# Patient Record
Sex: FEMALE | Race: WHITE | NOT HISPANIC OR LATINO | Employment: OTHER | URBAN - METROPOLITAN AREA
[De-identification: names, ages, dates, MRNs, and addresses within clinical notes are randomized per-mention and may not be internally consistent; named-entity substitution may affect disease eponyms.]

---

## 2017-01-02 ENCOUNTER — HOSPITAL ENCOUNTER (OUTPATIENT)
Dept: URGENT CARE | Facility: CLINIC | Age: 82
Setting detail: SPECIMEN
Discharge: HOME | End: 2017-01-02
Attending: FAMILY MEDICINE | Admitting: FAMILY MEDICINE

## 2017-01-02 LAB
BACTERIA SPEC AEROBE CULT: NORMAL
Lab: NORMAL
MICRO REPORT STATUS: NORMAL
SPECIMEN SOURCE: NORMAL

## 2019-07-19 ENCOUNTER — TELEPHONE (OUTPATIENT)
Dept: CARDIOLOGY | Facility: CLINIC | Age: 84
End: 2019-07-19

## 2019-07-19 NOTE — TELEPHONE ENCOUNTER
Called patient, advised we had xarelto samples ready to be picked up. Gave patient 56 tablets with 0 refills.

## 2019-08-15 ENCOUNTER — OFFICE VISIT (OUTPATIENT)
Dept: CARDIOLOGY | Facility: CLINIC | Age: 84
End: 2019-08-15

## 2019-08-15 VITALS
OXYGEN SATURATION: 97 % | HEIGHT: 64 IN | DIASTOLIC BLOOD PRESSURE: 80 MMHG | HEART RATE: 81 BPM | SYSTOLIC BLOOD PRESSURE: 146 MMHG | WEIGHT: 163.75 LBS | BODY MASS INDEX: 27.96 KG/M2

## 2019-08-15 DIAGNOSIS — Z51.81 ANTICOAGULATION MANAGEMENT ENCOUNTER: Primary | ICD-10-CM

## 2019-08-15 DIAGNOSIS — Z79.01 ANTICOAGULATION MANAGEMENT ENCOUNTER: Primary | ICD-10-CM

## 2019-08-15 DIAGNOSIS — I10 ESSENTIAL HYPERTENSION: ICD-10-CM

## 2019-08-15 DIAGNOSIS — I48.20 CHRONIC ATRIAL FIBRILLATION (HCC): ICD-10-CM

## 2019-08-15 DIAGNOSIS — E78.5 DYSLIPIDEMIA: ICD-10-CM

## 2019-08-15 PROCEDURE — 93000 ELECTROCARDIOGRAM COMPLETE: CPT | Performed by: INTERNAL MEDICINE

## 2019-08-15 PROCEDURE — 99214 OFFICE O/P EST MOD 30 MIN: CPT | Performed by: INTERNAL MEDICINE

## 2019-08-15 RX ORDER — ERGOCALCIFEROL 1.25 MG/1
CAPSULE ORAL
Refills: 1 | COMMUNITY
Start: 2019-07-22

## 2019-08-15 NOTE — PROGRESS NOTES
Encounter Date:08/15/2019  Last seen 11/29/2018      Patient ID: Nir Mendez is a 83 y.o. female.    Chief Complaint:  Chronic atrial fibrillation  Anticoagulation management  Hypertension  Dyslipidemia    History of Present Illness  Since I have last seen, the patient has been without any chest discomfort ,shortness of breath, palpitations, dizziness or syncope.  Denies having any headache ,abdominal pain ,nausea, vomiting , diarrhea constipation, loss of weight or loss of appetite.  Denies having any excessive bruising ,hematuria or blood in the stool.    Review of all systems negative except as indicated    Assessment and Plan       /////////////////////    Impression  ----------------------  -Chronic atrial fibrillation. Patient has converted to sinus rhythm but did not stay in sinus rhythm.      -anticoagulation -on Xarelto    -Hypertension and dyslipidemia    -history of normal coronary arteries and normal left ventricular function 10/31/2014.  -----------  Plan  -----------  EKG showed atrial fibrillation with controlled ventricular response  Patient is not having any angina pectoris or congestive heart failure.  Continue metoprolol once a day and Cardizem CD   Excessive bruising-  improved with discontinuation of baby aspirin  ContinueXarelto  Anticoagulation status was reviewed.   medications were reviewed and updated.  Followup in the office in 6 months.  /////////////           Diagnosis Plan   1. Anticoagulation management encounter  ECG 12 Lead   2. Dyslipidemia  ECG 12 Lead   3. Essential hypertension  ECG 12 Lead   4. Chronic atrial fibrillation (CMS/HCC)  ECG 12 Lead   LAB RESULTS (LAST 7 DAYS)    CBC        BMP        CMP         BNP        TROPONIN        CoAg        Creatinine Clearance  CrCl cannot be calculated (Patient's most recent lab result is older than the maximum 30 days allowed.).    ABG        Radiology  No radiology results for the last day                The following portions of the  patient's history were reviewed and updated as appropriate: allergies, current medications, past family history, past medical history, past social history, past surgical history and problem list.    Review of Systems   Constitution: Positive for malaise/fatigue.   Cardiovascular: Positive for chest pain (heaviness ). Negative for leg swelling, palpitations and syncope.   Respiratory: Negative for shortness of breath.    Skin: Positive for rash (both legs, itching ).   Gastrointestinal: Negative for nausea and vomiting.   Neurological: Negative for dizziness, light-headedness and numbness.         Current Outpatient Medications:   •  atorvastatin (LIPITOR) 10 MG tablet, Take 10 mg by mouth Every Night., Disp: , Rfl: 2  •  diltiaZEM CD (CARDIZEM CD) 240 MG 24 hr capsule, Take 240 mg by mouth Every Morning., Disp: , Rfl: 1  •  metoprolol succinate XL (TOPROL-XL) 25 MG 24 hr tablet, Take 25 mg by mouth Every Morning., Disp: , Rfl: 3  •  rivaroxaban (XARELTO) 20 MG tablet, Take 1.5 tablets by mouth Every Night. 1/2 tablet , Disp: , Rfl:   •  rivaroxaban (XARELTO) 20 MG tablet, Take 1 tablet by mouth Daily., Disp: 56 tablet, Rfl: 0  •  triamterene-hydrochlorothiazide (MAXZIDE-25) 37.5-25 MG per tablet, Take 1 tablet by mouth Every Morning., Disp: , Rfl: 2  •  vitamin D (ERGOCALCIFEROL) 76652 units capsule capsule, TK 1 C PO Q WK, Disp: , Rfl: 1    No Known Allergies    Family History   Problem Relation Age of Onset   • Hypertension Other    • Stroke Other    • Mental illness Other        Past Surgical History:   Procedure Laterality Date   • CARDIAC CATHETERIZATION  10/2014       Past Medical History:   Diagnosis Date   • Atrial fibrillation (CMS/HCC)    • Hyperlipidemia    • Hypertension    • No known drug allergy        Family History   Problem Relation Age of Onset   • Hypertension Other    • Stroke Other    • Mental illness Other        Social History     Socioeconomic History   • Marital status:      Spouse  "name: Not on file   • Number of children: Not on file   • Years of education: Not on file   • Highest education level: Not on file   Tobacco Use   • Smoking status: Never Smoker   Substance and Sexual Activity   • Alcohol use: No     Frequency: Never           ECG 12 Lead  Date/Time: 8/15/2019 10:42 AM  Performed by: Isa Montejo MD  Authorized by: Isa Montejo MD   Comparison: compared with previous ECG   Comments: Atrial fibrillation with controlled ventricular response 80/min nonspecific ST-T wave changes normal axis normal intervals no ectopy.  No change from 11/29/2018              Objective:       Physical Exam    /80 (BP Location: Left arm, Patient Position: Sitting, Cuff Size: Adult)   Pulse 81   Ht 162.6 cm (64\")   Wt 74.3 kg (163 lb 12 oz)   SpO2 97%   BMI 28.11 kg/m²   The patient is alert, oriented and in no distress.    Vital signs as noted above.    Head and neck revealed no carotid bruits or jugular venous distension.  No thyromegaly or lymphadenopathy is present.    Lungs clear.  No wheezing.  Breath sounds are normal bilaterally.    Heart normal first and second heart sounds.  No murmur..  No pericardial rub is present.  No gallop is present.    Abdomen soft and nontender.  No organomegaly is present.    Extremities revealed good peripheral pulses without any pedal edema.  Bilateral lower extremities skin bumps probably cysts    Skin warm and dry.    Musculoskeletal system is grossly normal.    CNS grossly normal.        "

## 2019-09-23 RX ORDER — ATORVASTATIN CALCIUM 10 MG/1
TABLET, FILM COATED ORAL
Qty: 90 TABLET | Refills: 3 | Status: SHIPPED | OUTPATIENT
Start: 2019-09-23 | End: 2020-08-31

## 2019-10-30 ENCOUNTER — OFFICE VISIT (OUTPATIENT)
Dept: SURGERY | Facility: CLINIC | Age: 84
End: 2019-10-30

## 2019-10-30 ENCOUNTER — PREP FOR SURGERY (OUTPATIENT)
Dept: OTHER | Facility: HOSPITAL | Age: 84
End: 2019-10-30

## 2019-10-30 VITALS
DIASTOLIC BLOOD PRESSURE: 80 MMHG | SYSTOLIC BLOOD PRESSURE: 132 MMHG | OXYGEN SATURATION: 99 % | HEIGHT: 64 IN | BODY MASS INDEX: 27.31 KG/M2 | WEIGHT: 160 LBS | HEART RATE: 81 BPM

## 2019-10-30 DIAGNOSIS — C44.729 SQUAMOUS CELL CARCINOMA OF SKIN OF LEFT LOWER EXTREMITY, INCLUDING HIP: Primary | ICD-10-CM

## 2019-10-30 PROCEDURE — 99204 OFFICE O/P NEW MOD 45 MIN: CPT | Performed by: SURGERY

## 2019-10-30 RX ORDER — IMIQUIMOD 12.5 MG/.25G
CREAM TOPICAL 3 TIMES WEEKLY
COMMUNITY
End: 2021-04-07

## 2019-10-30 NOTE — H&P
Subjective   Nir Mendez is a 83 y.o. female.   No chief complaint on file.    There were no vitals taken for this visit.    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  The lesion on the left ankle is responding well to Aldara cream.  The lesion on her left medial thigh after biopsy and Aldara cream became very large tender and ulcerated in a very short period of time.  To go to her son's wedding in [place] and over the short duration of this trip it got much bigger and more ulcerated.  She denies any previous history of malignant neoplasm of the skin but is being followed closely by her dermatologist, Dr Camara.      The following portions of the patient's history were reviewed and updated as appropriate: allergies, current medications, past family history, past medical history, past social history, past surgical history and problem list.    Review of Systems   Constitutional: Negative for activity change and diaphoresis.   HENT: Negative.  Negative for sore throat and voice change.    Eyes: Negative for blurred vision.   Respiratory: Negative for wheezing.    Cardiovascular: Negative for chest pain.   Endocrine: Negative.  Negative for polyuria.   Genitourinary: Negative for urinary incontinence.   Musculoskeletal: Negative for arthralgias.   Skin: Positive for skin lesions. Negative for color change.   Neurological: Negative for dizziness, speech difficulty and confusion.   Hematological: Does not bruise/bleed easily.   Psychiatric/Behavioral: Negative for agitation.       Objective   Physical Exam   Constitutional: She is oriented to person, place, and time. She appears well-developed and well-nourished.   HENT:   Head: Normocephalic and atraumatic.   Eyes: EOM are normal.   Neck: Normal range of motion.   Cardiovascular: Normal rate.   Pulmonary/Chest:  Effort normal.   Abdominal: Soft.   Musculoskeletal: Normal range of motion.   Neurological: She is alert and oriented to person, place, and time.   Skin: Skin is warm and dry.   Ulcerated 2.5 cm lesion left upper medial shin.  Rounding neovascularization.  No cellulitis.  Very tender.  Multiple 1 cm mildly ulcerated skin changes over bilateral lower extremities.   Psychiatric: She has a normal mood and affect.         Assessment/Plan   There are no diagnoses linked to this encounter.  Please schedule wide local excision squamous cell carcinoma of the left lower extremity.  She will need clearance from Dr. Avalos to be off her Xarelto for her atrial fibrillation.  Continue to monitor the other lesions in conjunction with Dr. Camara in the event she fails medical management of these potential neoplasms as well.  Also discussed with her that she may require autologous split-thickness skin graft or delayed primary closure as primary closure at the time of surgery is not possible based on the size of her cancer.  Maria Ines Alejandre MD  10/30/2019  1:59 PM

## 2019-10-30 NOTE — PROGRESS NOTES
"Subjective   Nir Mendez is a 83 y.o. female.   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  The lesion on the left ankle is responding well to Aldara cream.  The lesion on her left medial thigh after biopsy and Aldara cream became very large tender and ulcerated in a very short period of time.  To go to her son's wedding in [place] and over the short duration of this trip it got much bigger and more ulcerated.  She denies any previous history of malignant neoplasm of the skin but is being followed closely by her dermatologist, Dr Camara.      The following portions of the patient's history were reviewed and updated as appropriate: allergies, current medications, past family history, past medical history, past social history, past surgical history and problem list.    Review of Systems   Constitutional: Negative for activity change and diaphoresis.   HENT: Negative.  Negative for sore throat and voice change.    Eyes: Negative for blurred vision.   Respiratory: Negative for wheezing.    Cardiovascular: Negative for chest pain.   Endocrine: Negative.  Negative for polyuria.   Genitourinary: Negative for urinary incontinence.   Musculoskeletal: Negative for arthralgias.   Skin: Positive for skin lesions. Negative for color change.   Neurological: Negative for dizziness, speech difficulty and confusion.   Hematological: Does not bruise/bleed easily.   Psychiatric/Behavioral: Negative for agitation.       Objective   Physical Exam   Constitutional: She is oriented to person, place, and time. She appears " well-developed and well-nourished.   HENT:   Head: Normocephalic and atraumatic.   Eyes: EOM are normal.   Neck: Normal range of motion.   Cardiovascular: Normal rate.   Pulmonary/Chest: Effort normal.   Abdominal: Soft.   Musculoskeletal: Normal range of motion.   Neurological: She is alert and oriented to person, place, and time.   Skin: Skin is warm and dry.   Ulcerated 2.5 cm lesion left upper medial shin.  Rounding neovascularization.  No cellulitis.  Very tender.  Multiple 1 cm mildly ulcerated skin changes over bilateral lower extremities.   Psychiatric: She has a normal mood and affect.         Assessment/Plan   Nir was seen today for skin cancer.    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  She will need clearance from Dr. Avalos to be off her Xarelto for her atrial fibrillation.  Continue to monitor the other lesions in conjunction with Dr. Camara in the event she fails medical management of these potential neoplasms as well.  Also discussed with her that she may require autologous split-thickness skin graft or delayed primary closure as primary closure at the time of surgery is not possible based on the size of her cancer.  Maria Ines Alejandre MD  10/30/2019  1:59 PM

## 2019-10-30 NOTE — PROGRESS NOTES
"Jaymie Mendez is a 83 y.o. female.   Chief Complaint   Patient presents with   • Skin Cancer     left ankle, left shin     /80 (BP Location: Left arm, Patient Position: Sitting, Cuff Size: Adult)   Pulse 81   Ht 162.6 cm (64\")   Wt 72.6 kg (160 lb)   SpO2 99%   BMI 27.46 kg/m²     HISTORY OF PRESENT ILLNESS:  ***      The following portions of the patient's history were reviewed and updated as appropriate: allergies, current medications, past family history, past medical history, past social history, past surgical history and problem list.    Review of Systems    Objective   Physical Exam      Assessment/Plan   There are no diagnoses linked to this encounter.    Note scribed for Dr. Pili Hagan PA-C  10/30/2019  1:32 PM  "

## 2019-11-12 ENCOUNTER — APPOINTMENT (OUTPATIENT)
Dept: PREADMISSION TESTING | Facility: HOSPITAL | Age: 84
End: 2019-11-12

## 2019-11-14 ENCOUNTER — APPOINTMENT (OUTPATIENT)
Dept: PREADMISSION TESTING | Facility: HOSPITAL | Age: 84
End: 2019-11-14

## 2019-11-18 RX ORDER — TRIAMTERENE AND HYDROCHLOROTHIAZIDE 37.5; 25 MG/1; MG/1
1 TABLET ORAL EVERY MORNING
Qty: 90 TABLET | Refills: 1 | Status: SHIPPED | OUTPATIENT
Start: 2019-11-18 | End: 2020-10-28

## 2019-11-21 RX ORDER — DILTIAZEM HYDROCHLORIDE 240 MG/1
CAPSULE, EXTENDED RELEASE ORAL
Qty: 90 CAPSULE | Refills: 0 | Status: SHIPPED | OUTPATIENT
Start: 2019-11-21 | End: 2020-06-01

## 2019-11-21 RX ORDER — METOPROLOL SUCCINATE 25 MG/1
TABLET, EXTENDED RELEASE ORAL
Qty: 90 TABLET | Refills: 0 | Status: SHIPPED | OUTPATIENT
Start: 2019-11-21 | End: 2020-03-02

## 2020-03-02 RX ORDER — METOPROLOL SUCCINATE 25 MG/1
TABLET, EXTENDED RELEASE ORAL
Qty: 90 TABLET | Refills: 0 | Status: SHIPPED | OUTPATIENT
Start: 2020-03-02 | End: 2020-04-13

## 2020-03-25 ENCOUNTER — OFFICE VISIT (OUTPATIENT)
Dept: CARDIOLOGY | Facility: CLINIC | Age: 85
End: 2020-03-25

## 2020-03-25 VITALS
BODY MASS INDEX: 26.98 KG/M2 | HEART RATE: 71 BPM | SYSTOLIC BLOOD PRESSURE: 121 MMHG | OXYGEN SATURATION: 98 % | DIASTOLIC BLOOD PRESSURE: 78 MMHG | WEIGHT: 158 LBS | HEIGHT: 64 IN

## 2020-03-25 DIAGNOSIS — I48.20 CHRONIC ATRIAL FIBRILLATION (HCC): Primary | ICD-10-CM

## 2020-03-25 DIAGNOSIS — I10 ESSENTIAL HYPERTENSION: ICD-10-CM

## 2020-03-25 DIAGNOSIS — E78.5 DYSLIPIDEMIA: ICD-10-CM

## 2020-03-25 DIAGNOSIS — Z79.01 CHRONIC ANTICOAGULATION: ICD-10-CM

## 2020-03-25 PROCEDURE — 93000 ELECTROCARDIOGRAM COMPLETE: CPT | Performed by: INTERNAL MEDICINE

## 2020-03-25 PROCEDURE — 99214 OFFICE O/P EST MOD 30 MIN: CPT | Performed by: INTERNAL MEDICINE

## 2020-03-25 NOTE — PROGRESS NOTES
Encounter Date:03/25/2020  6 months follow-up      Patient ID: Nir Mendez is a 84 y.o. female.    Chief Complaint:  Atrial fibrillation  Anticoagulation management  Hypertension  Dyslipidemia      History of Present Illness  Since I have last seen, the patient has been without any chest discomfort ,shortness of breath, palpitations, dizziness or syncope.  Denies having any headache ,abdominal pain ,nausea, vomiting , diarrhea constipation, loss of weight or loss of appetite.  Denies having any excessive bruising ,hematuria or blood in the stool.    Review of all systems negative except as indicated    Assessment and Plan       /////////////////////    Impression  ----------------------  -Chronic atrial fibrillation. Patient has converted to sinus rhythm but did not stay in sinus rhythm.       -anticoagulation -on Xarelto     -Hypertension and dyslipidemia     -history of normal coronary arteries and normal left ventricular function 10/31/2014.  -----------  Plan  -----------  EKG showed atrial fibrillation with controlled ventricular response  Patient is not having any angina pectoris or congestive heart failure.  Continue metoprolol once a day and Cardizem CD   ContinueXarelto  Anticoagulation status was reviewed.  medications were reviewed and updated.  Followup in the office in 6 months.  /////////////            Diagnosis Plan   1. Chronic atrial fibrillation     2. Chronic anticoagulation     3. Essential hypertension     4. Dyslipidemia     LAB RESULTS (LAST 7 DAYS)    CBC        BMP        CMP         BNP        TROPONIN        CoAg        Creatinine Clearance  CrCl cannot be calculated (Patient's most recent lab result is older than the maximum 30 days allowed.).    ABG        Radiology  No radiology results for the last day                The following portions of the patient's history were reviewed and updated as appropriate: allergies, current medications, past family history, past medical history, past  social history, past surgical history and problem list.    Review of Systems   Constitution: Negative for fever and malaise/fatigue.   HENT: Negative for congestion and hearing loss.    Eyes: Negative for double vision and visual disturbance.   Cardiovascular: Negative for chest pain, claudication, dyspnea on exertion, leg swelling, palpitations and syncope.   Respiratory: Negative for cough and shortness of breath.    Endocrine: Negative for cold intolerance.   Skin: Negative for color change and rash.   Musculoskeletal: Negative for arthritis and joint pain.   Gastrointestinal: Negative for abdominal pain and heartburn.   Genitourinary: Negative for hematuria.   Neurological: Negative for excessive daytime sleepiness and dizziness.   Psychiatric/Behavioral: Negative for depression. The patient is not nervous/anxious.    All other systems reviewed and are negative.        Current Outpatient Medications:   •  atorvastatin (LIPITOR) 10 MG tablet, TAKE 1 TABLET BY MOUTH EVERY NIGHT AT BEDTIME, Disp: 90 tablet, Rfl: 3  •  CARTIA  MG 24 hr capsule, TAKE 1 CAPSULE BY MOUTH EVERY MORNING, Disp: 90 capsule, Rfl: 0  •  imiquimod (ALDARA) 5 % cream, Apply  topically to the appropriate area as directed 3 (Three) Times a Week., Disp: , Rfl:   •  metoprolol succinate XL (TOPROL-XL) 25 MG 24 hr tablet, TAKE 1 TABLET BY MOUTH EVERY MORNING, Disp: 90 tablet, Rfl: 0  •  mupirocin (BACTROBAN) 2 % ointment, QUINTON TO LEFT SHIN BID, Disp: , Rfl: 3  •  rivaroxaban (XARELTO) 20 MG tablet, Take 1 tablet by mouth Daily., Disp: 56 tablet, Rfl: 0  •  triamterene-hydrochlorothiazide (MAXZIDE-25) 37.5-25 MG per tablet, TAKE 1 TABLET BY MOUTH EVERY MORNING, Disp: 90 tablet, Rfl: 1  •  vitamin D (ERGOCALCIFEROL) 00088 units capsule capsule, TK 1 C PO Q WK, Disp: , Rfl: 1    No Known Allergies    Family History   Problem Relation Age of Onset   • Hypertension Other    • Stroke Other    • Mental illness Other        Past Surgical History:  "  Procedure Laterality Date   • CARDIAC CATHETERIZATION  10/2014       Past Medical History:   Diagnosis Date   • Atrial fibrillation (CMS/HCC)    • Hyperlipidemia    • Hypertension    • No known drug allergy        Family History   Problem Relation Age of Onset   • Hypertension Other    • Stroke Other    • Mental illness Other        Social History     Socioeconomic History   • Marital status:      Spouse name: Not on file   • Number of children: Not on file   • Years of education: Not on file   • Highest education level: Not on file   Tobacco Use   • Smoking status: Never Smoker   • Smokeless tobacco: Never Used   Substance and Sexual Activity   • Alcohol use: No     Frequency: Never   • Drug use: Never   • Sexual activity: Defer           ECG 12 Lead  Date/Time: 3/25/2020 2:52 PM  Performed by: Isa Montejo MD  Authorized by: Isa Montejo MD   Comparison: compared with previous ECG   Similar to previous ECG  Comments: Atrial fibrillation with controlled ventricular response 62/min nonspecific ST-T wave changes normal axis normal intervals no ectopy no change from 8/15/2019              Objective:       Physical Exam    /78   Pulse 71   Ht 162.6 cm (64\")   Wt 71.7 kg (158 lb)   SpO2 98%   BMI 27.12 kg/m²   The patient is alert, oriented and in no distress.    Vital signs as noted above.    Head and neck revealed no carotid bruits or jugular venous distension.  No thyromegaly or lymphadenopathy is present.    Lungs clear.  No wheezing.  Breath sounds are normal bilaterally.    Heart normal first and second heart sounds.  No murmur..  No pericardial rub is present.  No gallop is present.    Abdomen soft and nontender.  No organomegaly is present.    Extremities revealed good peripheral pulses without any pedal edema.    Skin warm and dry.    Musculoskeletal system is grossly normal.    CNS grossly normal.        "

## 2020-04-13 RX ORDER — METOPROLOL SUCCINATE 25 MG/1
TABLET, EXTENDED RELEASE ORAL
Qty: 90 TABLET | Refills: 3 | Status: SHIPPED | OUTPATIENT
Start: 2020-04-13

## 2020-05-28 ENCOUNTER — TELEPHONE (OUTPATIENT)
Dept: CARDIOLOGY | Facility: CLINIC | Age: 85
End: 2020-05-28

## 2020-05-28 NOTE — TELEPHONE ENCOUNTER
Pt left VM asking for Xarelto Samples.   Samples ready for  - called pt to inform.VM not set up - no answer

## 2020-06-01 RX ORDER — DILTIAZEM HYDROCHLORIDE 240 MG/1
CAPSULE, COATED, EXTENDED RELEASE ORAL
Qty: 90 CAPSULE | Refills: 0 | Status: SHIPPED | OUTPATIENT
Start: 2020-06-01 | End: 2020-08-31

## 2020-07-02 ENCOUNTER — TELEPHONE (OUTPATIENT)
Dept: CARDIOLOGY | Facility: CLINIC | Age: 85
End: 2020-07-02

## 2020-08-31 RX ORDER — ATORVASTATIN CALCIUM 10 MG/1
TABLET, FILM COATED ORAL
Qty: 90 TABLET | Refills: 0 | Status: SHIPPED | OUTPATIENT
Start: 2020-08-31 | End: 2020-12-24 | Stop reason: SDUPTHER

## 2020-08-31 RX ORDER — DILTIAZEM HYDROCHLORIDE 240 MG/1
CAPSULE, COATED, EXTENDED RELEASE ORAL
Qty: 90 CAPSULE | Refills: 0 | Status: SHIPPED | OUTPATIENT
Start: 2020-08-31 | End: 2020-10-12

## 2020-09-28 ENCOUNTER — OFFICE VISIT (OUTPATIENT)
Dept: CARDIOLOGY | Facility: CLINIC | Age: 85
End: 2020-09-28

## 2020-09-28 VITALS
DIASTOLIC BLOOD PRESSURE: 85 MMHG | OXYGEN SATURATION: 95 % | BODY MASS INDEX: 27.4 KG/M2 | WEIGHT: 160.5 LBS | HEART RATE: 76 BPM | SYSTOLIC BLOOD PRESSURE: 165 MMHG | HEIGHT: 64 IN

## 2020-09-28 DIAGNOSIS — I10 ESSENTIAL HYPERTENSION: ICD-10-CM

## 2020-09-28 DIAGNOSIS — E78.5 DYSLIPIDEMIA: ICD-10-CM

## 2020-09-28 DIAGNOSIS — Z79.01 CHRONIC ANTICOAGULATION: ICD-10-CM

## 2020-09-28 DIAGNOSIS — I48.20 CHRONIC ATRIAL FIBRILLATION (HCC): Primary | ICD-10-CM

## 2020-09-28 PROCEDURE — 99214 OFFICE O/P EST MOD 30 MIN: CPT | Performed by: INTERNAL MEDICINE

## 2020-09-28 PROCEDURE — 93000 ELECTROCARDIOGRAM COMPLETE: CPT | Performed by: INTERNAL MEDICINE

## 2020-09-28 NOTE — PROGRESS NOTES
Encounter Date:09/28/2020  Last seen 3/25/2020      Patient ID: Nir Mendez is a 84 y.o. female.    Chief Complaint:  Atrial fibrillation  Anticoagulation management  Hypertension  Dyslipidemia        History of Present Illness  Since I have last seen, the patient has been without any chest discomfort ,shortness of breath, palpitations, dizziness or syncope.  Denies having any headache ,abdominal pain ,nausea, vomiting , diarrhea constipation, loss of weight or loss of appetite.  Denies having any,hematuria or blood in the stool.  Mild excessive bruising.  Review of all systems negative except as indicated.    Reviewed ROS  Assessment and Plan         /////////////////////    Impression  ----------------------  -Chronic atrial fibrillation. Patient has converted to sinus rhythm but did not stay in sinus rhythm.       -anticoagulation -on Xarelto     -Hypertension and dyslipidemia     -history of normal coronary arteries and normal left ventricular function 10/31/2014.  -----------  Plan  -----------  EKG showed atrial fibrillation with controlled ventricular response  Patient is not having any angina pectoris or congestive heart failure.  Continue metoprolol once a day and Cardizem CD   ContinueXarelto  Anticoagulation status was reviewed.  Patient is having mild excessive bruising.  Patient was asked to take half a tablet of Xarelto for couple days if she has excessive bruising  medications were reviewed and updated.  Followup in the office in 6 months.  Further plan will depend on patient's progress.  /////////////              Diagnosis Plan   1. Chronic atrial fibrillation (CMS/[facility])     2. Chronic anticoagulation     3. Essential hypertension     4. Dyslipidemia     LAB RESULTS (LAST 7 DAYS)    CBC        BMP        CMP         BNP        TROPONIN        CoAg        Creatinine Clearance  CrCl cannot be calculated (Patient's most recent lab result is older than the maximum 30 days allowed.).    ABG         Radiology  No radiology results for the last day                The following portions of the patient's history were reviewed and updated as appropriate: allergies, current medications, past family history, past medical history, past social history, past surgical history and problem list.    Review of Systems   Constitution: Negative for malaise/fatigue.   Cardiovascular: Negative for chest pain, leg swelling, palpitations and syncope.   Respiratory: Negative for shortness of breath.    Hematologic/Lymphatic: Bruises/bleeds easily.   Skin: Negative for rash.   Gastrointestinal: Negative for nausea and vomiting.   Neurological: Negative for dizziness, light-headedness and numbness.         Current Outpatient Medications:   •  atorvastatin (LIPITOR) 10 MG tablet, TAKE 1 TABLET BY MOUTH EVERY NIGHT AT BEDTIME, Disp: 90 tablet, Rfl: 0  •  dilTIAZem CD (CARDIZEM CD) 240 MG 24 hr capsule, TAKE 1 CAPSULE BY MOUTH EVERY MORNING, Disp: 90 capsule, Rfl: 0  •  imiquimod (ALDARA) 5 % cream, Apply  topically to the appropriate area as directed 3 (Three) Times a Week., Disp: , Rfl:   •  metoprolol succinate XL (TOPROL-XL) 25 MG 24 hr tablet, TAKE 1 TABLET BY MOUTH EVERY MORNING, Disp: 90 tablet, Rfl: 3  •  mupirocin (BACTROBAN) 2 % ointment, QUINTON TO LEFT SHIN BID, Disp: , Rfl: 3  •  rivaroxaban (Xarelto) 10 MG tablet, Take 1 tablet by mouth Daily., Disp: 28 tablet, Rfl: 0  •  triamterene-hydrochlorothiazide (MAXZIDE-25) 37.5-25 MG per tablet, TAKE 1 TABLET BY MOUTH EVERY MORNING, Disp: 90 tablet, Rfl: 1  •  vitamin D (ERGOCALCIFEROL) 80932 units capsule capsule, TK 1 C PO Q WK, Disp: , Rfl: 1    No Known Allergies    Family History   Problem Relation Age of Onset   • Hypertension Other    • Stroke Other    • Mental illness Other        Past Surgical History:   Procedure Laterality Date   • CARDIAC CATHETERIZATION  10/2014       Past Medical History:   Diagnosis Date   • Atrial fibrillation (CMS/HCC)    • Hyperlipidemia    •  "Hypertension    • No known drug allergy        Family History   Problem Relation Age of Onset   • Hypertension Other    • Stroke Other    • Mental illness Other        Social History     Socioeconomic History   • Marital status:      Spouse name: Not on file   • Number of children: Not on file   • Years of education: Not on file   • Highest education level: Not on file   Tobacco Use   • Smoking status: Never Smoker   • Smokeless tobacco: Never Used   Substance and Sexual Activity   • Alcohol use: No     Frequency: Never   • Drug use: Never   • Sexual activity: Defer           ECG 12 Lead    Date/Time: 9/28/2020 1:55 PM  Performed by: Isa Montejo MD  Authorized by: Isa Montejo MD   Comparison: compared with previous ECG   Similar to previous ECG  Comparison to previous ECG: Atrial fibrillation with controlled ventricular response 74/min nonspecific ST-T wave changes normal axis normal intervals no ectopy no change from 3/25/2020                Objective:       Physical Exam    /85   Pulse 76   Ht 162.6 cm (64\")   Wt 72.8 kg (160 lb 8 oz)   SpO2 95%   BMI 27.55 kg/m²   The patient is alert, oriented and in no distress.    Vital signs as noted above.    Head and neck revealed no carotid bruits or jugular venous distension.  No thyromegaly or lymphadenopathy is present.    Lungs clear.  No wheezing.  Breath sounds are normal bilaterally.    Heart normal first and second heart sounds.  No murmur..  No pericardial rub is present.  No gallop is present.    Abdomen soft and nontender.  No organomegaly is present.    Extremities revealed good peripheral pulses without any pedal edema.    Skin warm and dry.  Mild bruising.    Musculoskeletal system is grossly normal.    CNS grossly normal.        "

## 2020-10-12 RX ORDER — DILTIAZEM HYDROCHLORIDE 240 MG/1
CAPSULE, COATED, EXTENDED RELEASE ORAL
Qty: 90 CAPSULE | Refills: 3 | Status: SHIPPED | OUTPATIENT
Start: 2020-10-12 | End: 2021-09-29

## 2020-10-28 RX ORDER — TRIAMTERENE AND HYDROCHLOROTHIAZIDE 37.5; 25 MG/1; MG/1
1 TABLET ORAL EVERY MORNING
Qty: 90 TABLET | Refills: 1 | Status: SHIPPED | OUTPATIENT
Start: 2020-10-28

## 2020-12-24 RX ORDER — ATORVASTATIN CALCIUM 10 MG/1
10 TABLET, FILM COATED ORAL
Qty: 90 TABLET | Refills: 3 | Status: SHIPPED | OUTPATIENT
Start: 2020-12-24 | End: 2022-02-13

## 2021-01-20 NOTE — TELEPHONE ENCOUNTER
Pt needing samples, xarelto 10mg samples are upfront waiting to be picked up. Please sign off on rx.

## 2021-04-07 ENCOUNTER — OFFICE VISIT (OUTPATIENT)
Dept: CARDIOLOGY | Facility: CLINIC | Age: 86
End: 2021-04-07

## 2021-04-07 VITALS
DIASTOLIC BLOOD PRESSURE: 88 MMHG | HEIGHT: 64 IN | OXYGEN SATURATION: 95 % | HEART RATE: 76 BPM | SYSTOLIC BLOOD PRESSURE: 150 MMHG | TEMPERATURE: 98.2 F | BODY MASS INDEX: 27.31 KG/M2 | WEIGHT: 160 LBS

## 2021-04-07 DIAGNOSIS — E78.5 DYSLIPIDEMIA: ICD-10-CM

## 2021-04-07 DIAGNOSIS — I10 ESSENTIAL HYPERTENSION: ICD-10-CM

## 2021-04-07 DIAGNOSIS — I48.20 CHRONIC ATRIAL FIBRILLATION (HCC): Primary | ICD-10-CM

## 2021-04-07 DIAGNOSIS — Z79.01 CHRONIC ANTICOAGULATION: ICD-10-CM

## 2021-04-07 PROCEDURE — 93000 ELECTROCARDIOGRAM COMPLETE: CPT | Performed by: INTERNAL MEDICINE

## 2021-04-07 PROCEDURE — 99214 OFFICE O/P EST MOD 30 MIN: CPT | Performed by: INTERNAL MEDICINE

## 2021-04-07 NOTE — PROGRESS NOTES
Encounter Date:04/07/2021  Last seen 9/28/2020      Patient ID: Nir Mendez is a 85 y.o. female.    Chief Complaint:  Atrial fibrillation  Anticoagulation management  Hypertension  Dyslipidemia        History of Present Illness  Since I have last seen, the patient has been without any chest discomfort ,shortness of breath, palpitations, dizziness or syncope.  Denies having any headache ,abdominal pain ,nausea, vomiting , diarrhea constipation, loss of weight or loss of appetite.  Denies having any excessive bruising ,hematuria or blood in the stool.    Review of all systems negative except as indicated.    Reviewed ROS.    Assessment and Plan         /////////////////////    Impression  ----------------------  -Chronic atrial fibrillation. Patient has converted to sinus rhythm but did not stay in sinus rhythm.       -anticoagulation -on Xarelto     -Hypertension and dyslipidemia     -history of normal coronary arteries and normal left ventricular function 10/31/2014.  -----------  Plan  -----------  Chronic atrial fibrillation  EKG showed atrial fibrillation with controlled ventricular response  Patient is not having any angina pectoris or congestive heart failure.  Continue metoprolol once a day and Cardizem CD    Anticoagulation status reviewed  ContinueXarelto    Hypertension-150/88.  Continue Cardizem CD metoprolol triamterene hydrochlorothiazide    Dyslipidemia-continue atorvastatin.    Medications were reviewed and updated.  Followup in the office in 6 months.  Further plan will depend on patient's progress.  /////////////                            Diagnosis Plan   1. Chronic atrial fibrillation (CMS/[facility])     2. Essential hypertension     3. Dyslipidemia     4. Chronic anticoagulation     LAB RESULTS (LAST 7 DAYS)    CBC        BMP        CMP         BNP        TROPONIN        CoAg        Creatinine Clearance  CrCl cannot be calculated (Patient's most recent lab result is older than the maximum 30 days  allowed.).    ABG        Radiology  No radiology results for the last day                The following portions of the patient's history were reviewed and updated as appropriate: allergies, current medications, past family history, past medical history, past social history, past surgical history and problem list.    Review of Systems   Constitutional: Negative for malaise/fatigue.   Cardiovascular: Negative for chest pain, leg swelling and palpitations.   Respiratory: Negative for shortness of breath.    Skin: Negative for rash.   Neurological: Negative for dizziness, light-headedness and numbness.         Current Outpatient Medications:   •  atorvastatin (LIPITOR) 10 MG tablet, Take 1 tablet by mouth every night at bedtime., Disp: 90 tablet, Rfl: 3  •  dilTIAZem CD (CARDIZEM CD) 240 MG 24 hr capsule, TAKE 1 CAPSULE BY MOUTH EVERY MORNING, Disp: 90 capsule, Rfl: 3  •  metoprolol succinate XL (TOPROL-XL) 25 MG 24 hr tablet, TAKE 1 TABLET BY MOUTH EVERY MORNING, Disp: 90 tablet, Rfl: 3  •  rivaroxaban (Xarelto) 10 MG tablet, Take 1 tablet by mouth Daily., Disp: 28 tablet, Rfl: 0  •  triamterene-hydrochlorothiazide (MAXZIDE-25) 37.5-25 MG per tablet, TAKE 1 TABLET BY MOUTH EVERY MORNING, Disp: 90 tablet, Rfl: 1  •  vitamin D (ERGOCALCIFEROL) 01846 units capsule capsule, TK 1 C PO Q WK, Disp: , Rfl: 1    No Known Allergies    Family History   Problem Relation Age of Onset   • Hypertension Other    • Stroke Other    • Mental illness Other        Past Surgical History:   Procedure Laterality Date   • CARDIAC CATHETERIZATION  10/2014       Past Medical History:   Diagnosis Date   • Atrial fibrillation (CMS/HCC)    • Hyperlipidemia    • Hypertension    • No known drug allergy        Family History   Problem Relation Age of Onset   • Hypertension Other    • Stroke Other    • Mental illness Other        Social History     Socioeconomic History   • Marital status:      Spouse name: Not on file   • Number of children:  "Not on file   • Years of education: Not on file   • Highest education level: Not on file   Tobacco Use   • Smoking status: Never Smoker   • Smokeless tobacco: Never Used   Substance and Sexual Activity   • Alcohol use: No   • Drug use: Never   • Sexual activity: Defer           ECG 12 Lead    Date/Time: 4/7/2021 2:21 PM  Performed by: Isa Montejo MD  Authorized by: Isa Montejo MD   Comparison: compared with previous ECG   Similar to previous ECG  Comparison to previous ECG: Atrial fibrillation with controlled ventricular response 58/min nonspecific ST-T wave changes normal axis normal intervals no ectopy no significant change from 9/28/2020                Objective:       Physical Exam    /88   Pulse 76   Temp 98.2 °F (36.8 °C)   Ht 162.6 cm (64\")   Wt 72.6 kg (160 lb)   SpO2 95%   BMI 27.46 kg/m²   The patient is alert, oriented and in no distress.    Vital signs as noted above.    Head and neck revealed no carotid bruits or jugular venous distension.  No thyromegaly or lymphadenopathy is present.    Lungs clear.  No wheezing.  Breath sounds are normal bilaterally.    Heart normal first and second heart sounds.  No murmur..  No pericardial rub is present.  No gallop is present.    Abdomen soft and nontender.  No organomegaly is present.    Extremities revealed good peripheral pulses without any pedal edema.    Skin warm and dry.    Musculoskeletal system is grossly normal.    CNS grossly normal.    Reviewed and unchanged from last visit.        "

## 2021-08-06 ENCOUNTER — TELEPHONE (OUTPATIENT)
Dept: CARDIOLOGY | Facility: CLINIC | Age: 86
End: 2021-08-06

## 2021-08-06 NOTE — TELEPHONE ENCOUNTER
Patient called and is needing samples for Xarelto 10mg po qd. 5 bottles were placed upfront to be picked up as well as a co pay card for patient to get assistance.

## 2021-09-13 ENCOUNTER — TELEPHONE (OUTPATIENT)
Dept: CARDIOLOGY | Facility: CLINIC | Age: 86
End: 2021-09-13

## 2021-09-13 NOTE — TELEPHONE ENCOUNTER
Called patient - advised patient samples are ready along with assistance card. Need to try to get that assistance approved to be able to continue with samples if in donut hole.   Understood.

## 2021-09-29 RX ORDER — DILTIAZEM HYDROCHLORIDE 240 MG/1
CAPSULE, COATED, EXTENDED RELEASE ORAL
Qty: 90 CAPSULE | Refills: 2 | Status: SHIPPED | OUTPATIENT
Start: 2021-09-29 | End: 2021-10-21

## 2021-10-12 ENCOUNTER — TELEPHONE (OUTPATIENT)
Dept: CARDIOLOGY | Facility: CLINIC | Age: 86
End: 2021-10-12

## 2021-10-19 ENCOUNTER — OFFICE VISIT (OUTPATIENT)
Dept: CARDIOLOGY | Facility: CLINIC | Age: 86
End: 2021-10-19

## 2021-10-19 VITALS
WEIGHT: 153 LBS | OXYGEN SATURATION: 100 % | HEART RATE: 67 BPM | HEIGHT: 64 IN | DIASTOLIC BLOOD PRESSURE: 72 MMHG | BODY MASS INDEX: 26.12 KG/M2 | SYSTOLIC BLOOD PRESSURE: 128 MMHG

## 2021-10-19 DIAGNOSIS — E78.5 DYSLIPIDEMIA: ICD-10-CM

## 2021-10-19 DIAGNOSIS — I10 ESSENTIAL HYPERTENSION: ICD-10-CM

## 2021-10-19 DIAGNOSIS — I48.20 CHRONIC ATRIAL FIBRILLATION (HCC): Primary | ICD-10-CM

## 2021-10-19 DIAGNOSIS — Z79.01 CHRONIC ANTICOAGULATION: ICD-10-CM

## 2021-10-19 PROCEDURE — 93000 ELECTROCARDIOGRAM COMPLETE: CPT | Performed by: INTERNAL MEDICINE

## 2021-10-19 PROCEDURE — 99214 OFFICE O/P EST MOD 30 MIN: CPT | Performed by: INTERNAL MEDICINE

## 2021-10-19 NOTE — PROGRESS NOTES
Encounter Date:10/19/2021  Last seen 4/7/2021      Patient ID: Nir Mendez is a 85 y.o. female.    Chief Complaint:  Atrial fibrillation  Anticoagulation management  Hypertension  Dyslipidemia        History of Present Illness  Since I have last seen, the patient has been without any chest discomfort ,shortness of breath, palpitations, dizziness or syncope.  Denies having any headache ,abdominal pain ,nausea, vomiting , diarrhea constipation, loss of weight or loss of appetite.  Denies having any excessive bruising ,hematuria or blood in the stool.    Review of all systems negative except as indicated.    Reviewed ROS.    Assessment and Plan         /////////////////////    Impression  ----------------------  -Chronic atrial fibrillation. Patient has converted to sinus rhythm but did not stay in sinus rhythm.       -anticoagulation -on Xarelto     -Hypertension and dyslipidemia     -history of normal coronary arteries and normal left ventricular function 10/31/2014.  -----------  Plan  -----------  Chronic atrial fibrillation-rate is well controlled  EKG showed atrial fibrillation with controlled ventricular response-10/19/2021  Patient is not having any angina pectoris or congestive heart failure.  Continue metoprolol once a day and Cardizem CD     Anticoagulation status reviewed  Continue Xarelto 10 mg a day     Hypertension-well-controlled  128/72  Continue Cardizem CD metoprolol triamterene hydrochlorothiazide     Dyslipidemia-continue atorvastatin.     Medications were reviewed and updated.  Followup in the office in 6 months.  Further plan will depend on patient's progress.  /////////////                              Diagnosis Plan   1. Chronic atrial fibrillation (HCC)  ECG 12 Lead   2. Essential hypertension  ECG 12 Lead   3. Dyslipidemia  ECG 12 Lead   4. Chronic anticoagulation  ECG 12 Lead   LAB RESULTS (LAST 7 DAYS)    CBC        BMP        CMP         BNP        TROPONIN        CoAg        Creatinine  Clearance  CrCl cannot be calculated (Patient's most recent lab result is older than the maximum 30 days allowed.).    ABG        Radiology  No radiology results for the last day                The following portions of the patient's history were reviewed and updated as appropriate: allergies, current medications, past family history, past medical history, past social history, past surgical history and problem list.    Review of Systems   Constitutional: Positive for malaise/fatigue. Negative for fever.   HENT: Negative for ear pain and nosebleeds.    Eyes: Negative for blurred vision and double vision.   Cardiovascular: Negative for chest pain, dyspnea on exertion and palpitations.   Respiratory: Negative for cough and shortness of breath.    Skin: Negative for rash.   Musculoskeletal: Negative for joint pain.   Gastrointestinal: Negative for abdominal pain, nausea and vomiting.   Neurological: Negative for focal weakness and headaches.   Psychiatric/Behavioral: Negative for depression. The patient is not nervous/anxious.    All other systems reviewed and are negative.        Current Outpatient Medications:   •  atorvastatin (LIPITOR) 10 MG tablet, Take 1 tablet by mouth every night at bedtime., Disp: 90 tablet, Rfl: 3  •  dilTIAZem CD (CARDIZEM CD) 240 MG 24 hr capsule, TAKE 1 CAPSULE BY MOUTH EVERY MORNING, Disp: 90 capsule, Rfl: 2  •  metoprolol succinate XL (TOPROL-XL) 25 MG 24 hr tablet, TAKE 1 TABLET BY MOUTH EVERY MORNING, Disp: 90 tablet, Rfl: 3  •  rivaroxaban (Xarelto) 10 MG tablet, Take 1 tablet by mouth Daily., Disp: 35 tablet, Rfl: 0  •  triamterene-hydrochlorothiazide (MAXZIDE-25) 37.5-25 MG per tablet, TAKE 1 TABLET BY MOUTH EVERY MORNING, Disp: 90 tablet, Rfl: 1  •  vitamin D (ERGOCALCIFEROL) 09770 units capsule capsule, TK 1 C PO Q WK, Disp: , Rfl: 1    No Known Allergies    Family History   Problem Relation Age of Onset   • Hypertension Other    • Stroke Other    • Mental illness Other        Past  "Surgical History:   Procedure Laterality Date   • CARDIAC CATHETERIZATION  10/2014       Past Medical History:   Diagnosis Date   • Atrial fibrillation (HCC)    • Hyperlipidemia    • Hypertension    • No known drug allergy        Family History   Problem Relation Age of Onset   • Hypertension Other    • Stroke Other    • Mental illness Other        Social History     Socioeconomic History   • Marital status:    Tobacco Use   • Smoking status: Never Smoker   • Smokeless tobacco: Never Used   Vaping Use   • Vaping Use: Never used   Substance and Sexual Activity   • Alcohol use: No   • Drug use: Never   • Sexual activity: Defer           ECG 12 Lead    Date/Time: 10/19/2021 2:13 PM  Performed by: Isa Montejo MD  Authorized by: Isa Montejo MD   Comparison: compared with previous ECG   Similar to previous ECG  Comparison to previous ECG: Atrial fibrillation with controlled ventricular response 57/min nonspecific ST-T wave changes normal axis normal intervals no ectopy no significant change from 4/7/2021                Objective:       Physical Exam    /72   Pulse 67   Ht 162.6 cm (64\")   Wt 69.4 kg (153 lb)   SpO2 100%   BMI 26.26 kg/m²   The patient is alert, oriented and in no distress.    Vital signs as noted above.    Head and neck revealed no carotid bruits or jugular venous distension.  No thyromegaly or lymphadenopathy is present.    Lungs clear.  No wheezing.  Breath sounds are normal bilaterally.    Heart normal first and second heart sounds.  No murmur..  No pericardial rub is present.  No gallop is present.    Abdomen soft and nontender.  No organomegaly is present.    Extremities revealed good peripheral pulses without any pedal edema.    Skin warm and dry.    Musculoskeletal system is grossly normal.    CNS grossly normal.    Reviewed and unchanged from last visit.        "

## 2021-10-21 RX ORDER — DILTIAZEM HYDROCHLORIDE 240 MG/1
CAPSULE, COATED, EXTENDED RELEASE ORAL
Qty: 90 CAPSULE | Refills: 3 | Status: SHIPPED | OUTPATIENT
Start: 2021-10-21 | End: 2023-03-02

## 2021-10-21 NOTE — TELEPHONE ENCOUNTER
Rx Refill Note  Requested Prescriptions     Pending Prescriptions Disp Refills   • dilTIAZem CD (CARDIZEM CD) 240 MG 24 hr capsule [Pharmacy Med Name: DILTIAZEM CD 240MG CAPSULES (24 HR)] 90 capsule 3     Sig: TAKE 1 CAPSULE BY MOUTH EVERY MORNING      Last office visit with prescribing clinician: 10/19/2021      Next office visit with prescribing clinician: 4/21/2022            Umu Porter MA  10/21/21, 14:12 EDT

## 2022-01-21 ENCOUNTER — TELEPHONE (OUTPATIENT)
Dept: CARDIOLOGY | Facility: CLINIC | Age: 87
End: 2022-01-21

## 2022-02-13 RX ORDER — ATORVASTATIN CALCIUM 10 MG/1
10 TABLET, FILM COATED ORAL
Qty: 90 TABLET | Refills: 3 | Status: SHIPPED | OUTPATIENT
Start: 2022-02-13 | End: 2022-12-19

## 2022-02-13 NOTE — TELEPHONE ENCOUNTER
Rx Refill Note  Requested Prescriptions     Pending Prescriptions Disp Refills   • atorvastatin (LIPITOR) 10 MG tablet [Pharmacy Med Name: ATORVASTATIN 10MG TABLETS] 90 tablet 3     Sig: TAKE 1 TABLET BY MOUTH EVERY NIGHT AT BEDTIME      Last office visit with prescribing clinician: 10/19/2021      Next office visit with prescribing clinician: 4/21/2022            Umu Porter MA  02/13/22, 12:33 EST

## 2022-02-18 ENCOUNTER — TELEPHONE (OUTPATIENT)
Dept: CARDIOLOGY | Facility: CLINIC | Age: 87
End: 2022-02-18

## 2022-02-18 NOTE — TELEPHONE ENCOUNTER
Rx Refill Note  Requested Prescriptions     Pending Prescriptions Disp Refills   • rivaroxaban (Xarelto) 10 MG tablet 35 tablet 0     Sig: Take 1 tablet by mouth Daily.      Last office visit with prescribing clinician: 10/19/2021      Next office visit with prescribing clinician: 4/21/2022            Lynda Paiz MA  02/18/22, 12:47 EST

## 2022-04-29 ENCOUNTER — TELEPHONE (OUTPATIENT)
Dept: CARDIOLOGY | Facility: CLINIC | Age: 87
End: 2022-04-29

## 2022-06-08 ENCOUNTER — TELEPHONE (OUTPATIENT)
Dept: CARDIOLOGY | Facility: CLINIC | Age: 87
End: 2022-06-08

## 2022-07-08 NOTE — TELEPHONE ENCOUNTER
Rx Refill Note  Requested Prescriptions     Pending Prescriptions Disp Refills   • rivaroxaban (Xarelto) 10 MG tablet 10 tablet 0     Sig: Take 1 tablet by mouth Daily.      Last office visit with prescribing clinician: 10/19/2021      Next office visit with prescribing clinician: 7/25/2022            Lynda Paiz MA  07/08/22, 14:23 EDT

## 2022-07-11 ENCOUNTER — TELEPHONE (OUTPATIENT)
Dept: CARDIOLOGY | Facility: CLINIC | Age: 87
End: 2022-07-11

## 2022-07-11 NOTE — TELEPHONE ENCOUNTER
WE GAVE PATIENT COUPON FOR XARELTO ON Friday.  PHARMACY COULD NOT DO ANYTHING BECAUSE THE PRESCRIPTION WAS CALLED IN FOR 10 TABLETS. CAN WE CHANGE THAT TO 30 TABLETS SO SHE CAN USE THE COUPON AND GET IT FOR FREE.

## 2022-07-15 ENCOUNTER — TELEPHONE (OUTPATIENT)
Dept: CARDIOLOGY | Facility: CLINIC | Age: 87
End: 2022-07-15

## 2022-07-15 NOTE — TELEPHONE ENCOUNTER
I called and spoke with out drug rep for Xarelto and she was not aware that the discount cards did not cover the 10mg dose. She is going to do some further research on this and get back with us.     However; she did want to clarify with  if the patient should be on the Xarelto 10mg for A-fib because the common dosage for a-fib is 20mg.

## 2022-07-15 NOTE — TELEPHONE ENCOUNTER
Called pharmacist RX for 10mg is not covered by coupon as stated on the packet. See original phone note. Pt has samples of Xarelto until issue can be addressed on Monday    no IV

## 2022-07-15 NOTE — TELEPHONE ENCOUNTER
PATIENT NEEDS A PRESCRIPTION FOR A STARTER PACK FOR XARELTO FOR 10MG TABS. THAT IS THE ONLY WAY TO GET THE 10MG. [address]. SHE IS OUT OF THEM.

## 2022-07-15 NOTE — TELEPHONE ENCOUNTER
Pt called and I spoke to Hector at [facility]   Pt has been on Xarelto 10mg daily for awhiile, she was given a coupon from our office recently.    The coupon can not be used for the 10mg    Pt is out of Xarelto she is on her way to  samples.     Will figure out coupon soon and get back to pt. Pt understood.

## 2022-08-10 ENCOUNTER — OFFICE VISIT (OUTPATIENT)
Dept: CARDIOLOGY | Facility: CLINIC | Age: 87
End: 2022-08-10

## 2022-08-10 VITALS
WEIGHT: 155.5 LBS | SYSTOLIC BLOOD PRESSURE: 151 MMHG | OXYGEN SATURATION: 99 % | BODY MASS INDEX: 26.55 KG/M2 | HEART RATE: 64 BPM | DIASTOLIC BLOOD PRESSURE: 80 MMHG | HEIGHT: 64 IN

## 2022-08-10 DIAGNOSIS — Z79.01 CHRONIC ANTICOAGULATION: ICD-10-CM

## 2022-08-10 DIAGNOSIS — E78.5 DYSLIPIDEMIA: ICD-10-CM

## 2022-08-10 DIAGNOSIS — I10 ESSENTIAL HYPERTENSION: ICD-10-CM

## 2022-08-10 DIAGNOSIS — I48.20 CHRONIC ATRIAL FIBRILLATION: Primary | ICD-10-CM

## 2022-08-10 PROCEDURE — 93000 ELECTROCARDIOGRAM COMPLETE: CPT | Performed by: INTERNAL MEDICINE

## 2022-08-10 PROCEDURE — 99214 OFFICE O/P EST MOD 30 MIN: CPT | Performed by: INTERNAL MEDICINE

## 2022-08-10 NOTE — PROGRESS NOTES
Encounter Date:08/10/2022  Last seen 10/19/2021      Patient ID: Nir Mendez is a 86 y.o. female.    Chief Complaint:    Atrial fibrillation  Anticoagulation management  Hypertension  Dyslipidemia        History of Present Illness  Since I have last seen, the patient has been without any chest discomfort ,shortness of breath, palpitations, dizziness or syncope.  Denies having any headache ,abdominal pain ,nausea, vomiting , diarrhea constipation, loss of weight or loss of appetite.  Denies having any excessive bruising ,hematuria or blood in the stool.    Review of all systems negative except as indicated.    Reviewed ROS.  Assessment and Plan         /////////////////////    Impression  ----------------------  -Chronic atrial fibrillation.  Patient has converted to sinus rhythm but did not stay in sinus rhythm.       -anticoagulation -on Xarelto     -Hypertension and dyslipidemia     -history of normal coronary arteries and normal left ventricular function 10/31/2014.  -----------  Plan  -----------  Chronic atrial fibrillation-rate is well controlled  EKG showed atrial fibrillation with controlled ventricular response-.  8/10/2022  Patient is not having any angina pectoris or congestive heart failure.  Continue metoprolol once a day and Cardizem CD     Anticoagulation status reviewed  Continue Xarelto 10 mg a day.  Samples of Xarelto if available.    Hypertension- 150/80  Continue Cardizem CD metoprolol triamterene hydrochlorothiazide     Dyslipidemia-continue atorvastatin.     Medications were reviewed and updated.  Followup in the office in 6 months.  Further plan will depend on patient's progress.  /////////////                    Diagnosis Plan   1. Chronic atrial fibrillation (HCC)  ECG 12 Lead   2. Essential hypertension  ECG 12 Lead   3. Dyslipidemia  ECG 12 Lead   4. Chronic anticoagulation  ECG 12 Lead   LAB RESULTS (LAST 7 DAYS)    CBC        BMP        CMP         BNP        TROPONIN        CoAg         Creatinine Clearance  CrCl cannot be calculated (Patient's most recent lab result is older than the maximum 30 days allowed.).    ABG        Radiology  No radiology results for the last day                The following portions of the patient's history were reviewed and updated as appropriate: allergies, current medications, past family history, past medical history, past social history, past surgical history and problem list.    Review of Systems   Constitutional: Positive for malaise/fatigue. Negative for fever.   Cardiovascular: Negative for chest pain, dyspnea on exertion and palpitations.   Respiratory: Negative for cough and shortness of breath.    Skin: Negative for rash.   Gastrointestinal: Negative for abdominal pain, nausea and vomiting.   Neurological: Negative for focal weakness and headaches.   All other systems reviewed and are negative.        Current Outpatient Medications:   •  atorvastatin (LIPITOR) 10 MG tablet, TAKE 1 TABLET BY MOUTH EVERY NIGHT AT BEDTIME, Disp: 90 tablet, Rfl: 3  •  dilTIAZem CD (CARDIZEM CD) 240 MG 24 hr capsule, TAKE 1 CAPSULE BY MOUTH EVERY MORNING, Disp: 90 capsule, Rfl: 3  •  metoprolol succinate XL (TOPROL-XL) 25 MG 24 hr tablet, TAKE 1 TABLET BY MOUTH EVERY MORNING, Disp: 90 tablet, Rfl: 3  •  rivaroxaban (Xarelto) 10 MG tablet, Take 1 tablet by mouth Daily., Disp: 30 tablet, Rfl: 0  •  triamterene-hydrochlorothiazide (MAXZIDE-25) 37.5-25 MG per tablet, TAKE 1 TABLET BY MOUTH EVERY MORNING, Disp: 90 tablet, Rfl: 1  •  vitamin D (ERGOCALCIFEROL) 01831 units capsule capsule, TK 1 C PO Q WK, Disp: , Rfl: 1    No Known Allergies    Family History   Problem Relation Age of Onset   • Hypertension Other    • Stroke Other    • Mental illness Other        Past Surgical History:   Procedure Laterality Date   • CARDIAC CATHETERIZATION  10/2014       Past Medical History:   Diagnosis Date   • Atrial fibrillation (HCC)    • Hyperlipidemia    • Hypertension    • No known drug  "allergy        Family History   Problem Relation Age of Onset   • Hypertension Other    • Stroke Other    • Mental illness Other        Social History     Socioeconomic History   • Marital status:    Tobacco Use   • Smoking status: Never Smoker   • Smokeless tobacco: Never Used   Vaping Use   • Vaping Use: Never used   Substance and Sexual Activity   • Alcohol use: No   • Drug use: Never   • Sexual activity: Defer           ECG 12 Lead    Date/Time: 8/10/2022 9:40 AM  Performed by: Isa Montejo MD  Authorized by: Isa Montejo MD   Comparison: compared with previous ECG   Similar to previous ECG  Comparison to previous ECG: Atrial fibrillation with controlled ventricular response nonspecific ST-T wave changes 60/min normal axis normal intervals no ectopy no significant change from 10/19/2021                Objective:       Physical Exam    /80 (BP Location: Right arm, Patient Position: Sitting, Cuff Size: Adult)   Pulse 64   Ht 162.6 cm (64\")   Wt 70.5 kg (155 lb 8 oz)   SpO2 99%   BMI 26.69 kg/m²   The patient is alert, oriented and in no distress.    Vital signs as noted above.    Head and neck revealed no carotid bruits or jugular venous distension.  No thyromegaly or lymphadenopathy is present.    Lungs clear.  No wheezing.  Breath sounds are normal bilaterally.    Heart normal first and second heart sounds.  No murmur..  No pericardial rub is present.  No gallop is present.    Abdomen soft and nontender.  No organomegaly is present.    Extremities revealed good peripheral pulses without any pedal edema.    Skin warm and dry.    Musculoskeletal system is grossly normal.    CNS grossly normal.    Reviewed and updated.        "

## 2022-09-23 ENCOUNTER — TELEPHONE (OUTPATIENT)
Dept: CARDIOLOGY | Facility: CLINIC | Age: 87
End: 2022-09-23

## 2022-09-23 NOTE — TELEPHONE ENCOUNTER
Contacted patient, advised samples are up front ready for .   Understood       Rx Refill Note  Requested Prescriptions     Pending Prescriptions Disp Refills   • rivaroxaban (Xarelto) 10 MG tablet 35 tablet 0     Sig: Take 1 tablet by mouth Daily.      Last office visit with prescribing clinician: 8/10/2022      Next office visit with prescribing clinician: 2/21/2023            Lynda Paiz MA  09/23/22, 13:06 EDT

## 2022-12-19 RX ORDER — ATORVASTATIN CALCIUM 10 MG/1
10 TABLET, FILM COATED ORAL
Qty: 90 TABLET | Refills: 3 | Status: SHIPPED | OUTPATIENT
Start: 2022-12-19

## 2022-12-19 NOTE — TELEPHONE ENCOUNTER
Rx Refill Note  Requested Prescriptions     Pending Prescriptions Disp Refills   • atorvastatin (LIPITOR) 10 MG tablet [Pharmacy Med Name: ATORVASTATIN 10MG TABLETS] 90 tablet 3     Sig: TAKE 1 TABLET BY MOUTH EVERY NIGHT AT BEDTIME      Last office visit with prescribing clinician: 8/10/2022   Last telemedicine visit with prescribing clinician: 2/21/2023   Next office visit with prescribing clinician: 2/21/2023                         Would you like a call back once the refill request has been completed: [] Yes [] No    If the office needs to give you a call back, can they leave a voicemail: [] Yes [] No    Steff Camilo MA  12/19/22, 08:04 EST

## 2023-02-12 NOTE — PROGRESS NOTES
Encounter Date:02/21/2023  Last seen 8/10/2022      Patient ID: Nir Mendez is a 87 y.o. female.    Chief Complaint:    Atrial fibrillation  Anticoagulation management  Hypertension  Dyslipidemia        History of Present Illness  Patient is having some tiredness.  Recently Dr. Barrientos changed her Xarelto to Coumadin because of cost.    Since I have last seen, the patient has been without any chest discomfort ,shortness of breath, palpitations, dizziness or syncope.  Denies having any headache ,abdominal pain ,nausea, vomiting , diarrhea constipation, loss of weight or loss of appetite.  Denies having any excessive bruising ,hematuria or blood in the stool.    Review of all systems negative except as indicated.    Reviewed ROS.    Assessment and Plan         /////////////////////    Impression  ----------------------  -Chronic atrial fibrillation.  Patient has converted to sinus rhythm but did not stay in sinus rhythm.       -anticoagulation -on Coumadin     -Hypertension and dyslipidemia     -history of normal coronary arteries and normal left ventricular function 10/31/2014.  -----------  Plan  -----------  Chronic atrial fibrillation-rate is well controlled  EKG showed atrial fibrillation with controlled ventricular response-.  2/21/2023  Patient is not having any angina pectoris or congestive heart failure.  Continue metoprolol once a day and Cardizem CD     Anticoagulation status reviewed  Recently patient has been switched from Xarelto to Coumadin by Dr. Barrientos.  Cost factor.  Patient would prefer to have PT/INR performed in the office here.  Previous PT/INR labs were reviewed.  Patient to have PT/INR performed in about 3 weeks in the office and adjust the dosage.     Hypertension-  well controlled.  130/80  Continue Cardizem CD metoprolol triamterene hydrochlorothiazide     Dyslipidemia-continue atorvastatin.     Medications were reviewed and updated.    Followup in the office in 6 months.  Further plan will  depend on patient's progress.  /////////////                      Diagnosis Plan   1. Chronic atrial fibrillation (HCC)        2. Essential hypertension        3. Dyslipidemia        4. Chronic anticoagulation        LAB RESULTS (LAST 7 DAYS)    CBC        BMP        CMP         BNP        TROPONIN        CoAg        Creatinine Clearance  CrCl cannot be calculated (Patient's most recent lab result is older than the maximum 30 days allowed.).    ABG        Radiology  No radiology results for the last day                The following portions of the patient's history were reviewed and updated as appropriate: allergies, current medications, past family history, past medical history, past social history, past surgical history and problem list.    Review of Systems   Constitutional: Positive for malaise/fatigue.   Cardiovascular: Negative for chest pain, leg swelling, palpitations and syncope.   Respiratory: Negative for shortness of breath.    Skin: Negative for rash.   Gastrointestinal: Negative for nausea and vomiting.   Neurological: Negative for dizziness, light-headedness and numbness.   All other systems reviewed and are negative.        Current Outpatient Medications:   •  atorvastatin (LIPITOR) 10 MG tablet, TAKE 1 TABLET BY MOUTH EVERY NIGHT AT BEDTIME, Disp: 90 tablet, Rfl: 3  •  dilTIAZem CD (CARDIZEM CD) 240 MG 24 hr capsule, TAKE 1 CAPSULE BY MOUTH EVERY MORNING, Disp: 90 capsule, Rfl: 3  •  metoprolol succinate XL (TOPROL-XL) 25 MG 24 hr tablet, TAKE 1 TABLET BY MOUTH EVERY MORNING, Disp: 90 tablet, Rfl: 3  •  triamterene-hydrochlorothiazide (MAXZIDE-25) 37.5-25 MG per tablet, TAKE 1 TABLET BY MOUTH EVERY MORNING, Disp: 90 tablet, Rfl: 1  •  vitamin D (ERGOCALCIFEROL) 34360 units capsule capsule, TK 1 C PO Q WK, Disp: , Rfl: 1  •  warfarin (COUMADIN) 3 MG tablet, Take 3 mg by mouth Daily. 1.5mg  M W F   1mg  S T R Sa, Disp: , Rfl:   •  rivaroxaban (Xarelto) 10 MG tablet, Take 1 tablet by mouth Daily.,  "Disp: 35 tablet, Rfl: 0    No Known Allergies    Family History   Problem Relation Age of Onset   • Hypertension Other    • Stroke Other    • Mental illness Other        Past Surgical History:   Procedure Laterality Date   • CARDIAC CATHETERIZATION  10/2014       Past Medical History:   Diagnosis Date   • Atrial fibrillation (HCC)    • Hyperlipidemia    • Hypertension    • No known drug allergy        Family History   Problem Relation Age of Onset   • Hypertension Other    • Stroke Other    • Mental illness Other        Social History     Socioeconomic History   • Marital status:    Tobacco Use   • Smoking status: Never   • Smokeless tobacco: Never   Vaping Use   • Vaping Use: Never used   Substance and Sexual Activity   • Alcohol use: No   • Drug use: Never   • Sexual activity: Defer           ECG 12 Lead    Date/Time: 2/21/2023 4:01 PM  Performed by: Isa Montejo MD  Authorized by: Isa Montejo MD   Comparison: compared with previous ECG   Similar to previous ECG  Comparison to previous ECG: Atrial fibrillation with controlled ventricular response 79/min nonspecific ST-T wave changes normal axis normal intervals no ectopy no significant change from previous EKG.                Objective:       Physical Exam    /81 (BP Location: Left arm, Patient Position: Sitting, Cuff Size: Large Adult)   Pulse 79   Ht 162.6 cm (64\")   Wt 69.9 kg (154 lb)   SpO2 98%   BMI 26.43 kg/m²   The patient is alert, oriented and in no distress.    Vital signs as noted above.    Head and neck revealed no carotid bruits or jugular venous distension.  No thyromegaly or lymphadenopathy is present.    Lungs clear.  No wheezing.  Breath sounds are normal bilaterally.    Heart normal first and second heart sounds.  No murmur..  No pericardial rub is present.  No gallop is present.    Abdomen soft and nontender.  No organomegaly is present.    Extremities revealed good peripheral pulses without any pedal " edema.    Skin warm and dry.    Musculoskeletal system is grossly normal.    CNS grossly normal.    Reviewed and updated.

## 2023-02-21 ENCOUNTER — OFFICE VISIT (OUTPATIENT)
Dept: CARDIOLOGY | Facility: CLINIC | Age: 88
End: 2023-02-21
Payer: MEDICARE

## 2023-02-21 VITALS
SYSTOLIC BLOOD PRESSURE: 130 MMHG | WEIGHT: 154 LBS | DIASTOLIC BLOOD PRESSURE: 81 MMHG | HEART RATE: 79 BPM | HEIGHT: 64 IN | BODY MASS INDEX: 26.29 KG/M2 | OXYGEN SATURATION: 98 %

## 2023-02-21 DIAGNOSIS — I48.20 CHRONIC ATRIAL FIBRILLATION: Primary | ICD-10-CM

## 2023-02-21 DIAGNOSIS — Z79.01 CHRONIC ANTICOAGULATION: ICD-10-CM

## 2023-02-21 DIAGNOSIS — I10 ESSENTIAL HYPERTENSION: ICD-10-CM

## 2023-02-21 DIAGNOSIS — E78.5 DYSLIPIDEMIA: ICD-10-CM

## 2023-02-21 PROCEDURE — 99214 OFFICE O/P EST MOD 30 MIN: CPT | Performed by: INTERNAL MEDICINE

## 2023-02-21 PROCEDURE — 93000 ELECTROCARDIOGRAM COMPLETE: CPT | Performed by: INTERNAL MEDICINE

## 2023-02-21 RX ORDER — WARFARIN SODIUM 3 MG/1
3 TABLET ORAL
COMMUNITY

## 2023-03-02 RX ORDER — DILTIAZEM HYDROCHLORIDE 240 MG/1
CAPSULE, COATED, EXTENDED RELEASE ORAL
Qty: 90 CAPSULE | Refills: 3 | Status: SHIPPED | OUTPATIENT
Start: 2023-03-02

## 2023-03-02 NOTE — TELEPHONE ENCOUNTER
Rx Refill Note  Requested Prescriptions     Pending Prescriptions Disp Refills   • dilTIAZem CD (CARDIZEM CD) 240 MG 24 hr capsule [Pharmacy Med Name: DILTIAZEM CD 240MG CAPSULES (24 HR)] 90 capsule 3     Sig: TAKE 1 CAPSULE BY MOUTH EVERY MORNING      Last office visit with prescribing clinician: 2/21/2023   Last telemedicine visit with prescribing clinician: 3/15/2023   Next office visit with prescribing clinician: 8/31/2023                           Umu Porter MA  03/02/23, 16:22 EST

## 2023-03-15 ENCOUNTER — ANTICOAGULATION VISIT (OUTPATIENT)
Dept: CARDIOLOGY | Facility: CLINIC | Age: 88
End: 2023-03-15
Payer: MEDICARE

## 2023-03-15 VITALS
SYSTOLIC BLOOD PRESSURE: 159 MMHG | WEIGHT: 154 LBS | BODY MASS INDEX: 26.43 KG/M2 | HEART RATE: 64 BPM | DIASTOLIC BLOOD PRESSURE: 59 MMHG

## 2023-03-15 DIAGNOSIS — Z79.01 CHRONIC ANTICOAGULATION: ICD-10-CM

## 2023-03-15 DIAGNOSIS — I48.20 CHRONIC ATRIAL FIBRILLATION: Primary | ICD-10-CM

## 2023-03-15 LAB — INR PPP: 1.5 (ref 0.9–1.1)

## 2023-03-15 PROCEDURE — 85610 PROTHROMBIN TIME: CPT | Performed by: INTERNAL MEDICINE

## 2023-03-15 PROCEDURE — 36416 COLLJ CAPILLARY BLOOD SPEC: CPT | Performed by: INTERNAL MEDICINE

## 2023-03-31 ENCOUNTER — ANTICOAGULATION VISIT (OUTPATIENT)
Dept: CARDIOLOGY | Facility: CLINIC | Age: 88
End: 2023-03-31
Payer: MEDICARE

## 2023-03-31 VITALS
WEIGHT: 156 LBS | SYSTOLIC BLOOD PRESSURE: 154 MMHG | DIASTOLIC BLOOD PRESSURE: 65 MMHG | HEART RATE: 62 BPM | BODY MASS INDEX: 26.78 KG/M2

## 2023-03-31 DIAGNOSIS — I48.20 CHRONIC ATRIAL FIBRILLATION: Primary | ICD-10-CM

## 2023-03-31 DIAGNOSIS — Z79.01 CHRONIC ANTICOAGULATION: ICD-10-CM

## 2023-03-31 LAB — INR PPP: 1.6 (ref 0.9–1.1)

## 2023-03-31 PROCEDURE — 36416 COLLJ CAPILLARY BLOOD SPEC: CPT | Performed by: INTERNAL MEDICINE

## 2023-03-31 PROCEDURE — 85610 PROTHROMBIN TIME: CPT | Performed by: INTERNAL MEDICINE

## 2023-04-14 ENCOUNTER — ANTICOAGULATION VISIT (OUTPATIENT)
Dept: CARDIOLOGY | Facility: CLINIC | Age: 88
End: 2023-04-14
Payer: MEDICARE

## 2023-04-14 VITALS
DIASTOLIC BLOOD PRESSURE: 63 MMHG | SYSTOLIC BLOOD PRESSURE: 140 MMHG | HEART RATE: 72 BPM | BODY MASS INDEX: 26.78 KG/M2 | WEIGHT: 156 LBS

## 2023-04-14 DIAGNOSIS — I48.20 CHRONIC ATRIAL FIBRILLATION: Primary | ICD-10-CM

## 2023-04-14 DIAGNOSIS — Z79.01 CHRONIC ANTICOAGULATION: ICD-10-CM

## 2023-04-14 LAB — INR PPP: 1.6 (ref 0.9–1.1)

## 2023-04-14 PROCEDURE — 85610 PROTHROMBIN TIME: CPT | Performed by: INTERNAL MEDICINE

## 2023-04-14 PROCEDURE — 36416 COLLJ CAPILLARY BLOOD SPEC: CPT | Performed by: INTERNAL MEDICINE

## 2023-05-01 ENCOUNTER — ANTICOAGULATION VISIT (OUTPATIENT)
Dept: CARDIOLOGY | Facility: CLINIC | Age: 88
End: 2023-05-01
Payer: MEDICARE

## 2023-05-01 VITALS
SYSTOLIC BLOOD PRESSURE: 142 MMHG | HEART RATE: 102 BPM | BODY MASS INDEX: 26.43 KG/M2 | WEIGHT: 154 LBS | OXYGEN SATURATION: 100 % | DIASTOLIC BLOOD PRESSURE: 81 MMHG

## 2023-05-01 DIAGNOSIS — I48.20 CHRONIC ATRIAL FIBRILLATION: Primary | ICD-10-CM

## 2023-05-01 DIAGNOSIS — Z79.01 CHRONIC ANTICOAGULATION: ICD-10-CM

## 2023-05-01 LAB — INR PPP: 2.2 (ref 2–3)

## 2023-05-01 PROCEDURE — 85610 PROTHROMBIN TIME: CPT | Performed by: INTERNAL MEDICINE

## 2023-05-01 PROCEDURE — 36416 COLLJ CAPILLARY BLOOD SPEC: CPT | Performed by: INTERNAL MEDICINE

## 2023-05-25 ENCOUNTER — ANTICOAGULATION VISIT (OUTPATIENT)
Dept: CARDIOLOGY | Facility: CLINIC | Age: 88
End: 2023-05-25
Payer: MEDICARE

## 2023-05-25 VITALS
SYSTOLIC BLOOD PRESSURE: 153 MMHG | BODY MASS INDEX: 26.95 KG/M2 | HEART RATE: 63 BPM | WEIGHT: 157 LBS | DIASTOLIC BLOOD PRESSURE: 63 MMHG

## 2023-05-25 DIAGNOSIS — Z79.01 CHRONIC ANTICOAGULATION: ICD-10-CM

## 2023-05-25 DIAGNOSIS — I48.20 CHRONIC ATRIAL FIBRILLATION: Primary | ICD-10-CM

## 2023-05-25 LAB — INR PPP: 2 (ref 0.9–1.1)

## 2023-05-25 PROCEDURE — 36416 COLLJ CAPILLARY BLOOD SPEC: CPT | Performed by: INTERNAL MEDICINE

## 2023-05-25 PROCEDURE — 85610 PROTHROMBIN TIME: CPT | Performed by: INTERNAL MEDICINE

## 2023-08-10 ENCOUNTER — ANTICOAGULATION VISIT (OUTPATIENT)
Dept: CARDIOLOGY | Facility: CLINIC | Age: 88
End: 2023-08-10
Payer: MEDICARE

## 2023-08-10 DIAGNOSIS — Z79.01 CHRONIC ANTICOAGULATION: ICD-10-CM

## 2023-08-10 DIAGNOSIS — I48.20 CHRONIC ATRIAL FIBRILLATION: Primary | ICD-10-CM

## 2023-08-10 LAB — INR PPP: 1.7 (ref 2–3)

## 2023-08-10 PROCEDURE — 85610 PROTHROMBIN TIME: CPT | Performed by: INTERNAL MEDICINE

## 2023-08-10 PROCEDURE — 36416 COLLJ CAPILLARY BLOOD SPEC: CPT | Performed by: INTERNAL MEDICINE

## 2023-08-16 NOTE — PROGRESS NOTES
Encounter Date:08/31/2023  Last seen 2/21/2023      Patient ID: Nir Mendez is a 87 y.o. female.    Chief Complaint:    Atrial fibrillation  Anticoagulation management  Hypertension  Dyslipidemia        History of Present Illness  Since I have last seen, the patient has been without any chest discomfort ,shortness of breath, palpitations, dizziness or syncope.  Denies having any headache ,abdominal pain ,nausea, vomiting , diarrhea constipation, loss of weight or loss of appetite.  Denies having any excessive bruising ,hematuria or blood in the stool.    Review of all systems negative except as indicated.    Reviewed ROS.    Assessment and Plan         /////////////////////  History  ----------------------  -Chronic atrial fibrillation.  Patient has converted to sinus rhythm but did not stay in sinus rhythm.       -anticoagulation -on Coumadin     -Hypertension and dyslipidemia     -history of normal coronary arteries and normal left ventricular function 10/31/2014.  -----------  Plan  -----------  Chronic atrial fibrillation-rate is well controlled  EKG showed atrial fibrillation with controlled ventricular response-.  8/31/2023  Patient is not having any angina pectoris or congestive heart failure.  Continue metoprolol once a day and Cardizem CD     Anticoagulation status reviewed  Patient is on Coumadin.  PT/INR on a monthly basis.    Hypertension-  well controlled.  123/73  Continue Cardizem CD metoprolol triamterene hydrochlorothiazide     Dyslipidemia-continue atorvastatin.     Medications were reviewed and updated.     Followup in the office in 6 months.    Further plan will depend on patient's progress.    Reviewed and updated-8/31/2023  /////////////              Diagnosis Plan   1. Chronic atrial fibrillation        2. Chronic anticoagulation        3. Essential hypertension        4. Dyslipidemia        LAB RESULTS (LAST 7 DAYS)    CBC        BMP        CMP         BNP        TROPONIN         CoAg  Results from last 7 days   Lab Units 08/10/23  0907   INR  1.70*       Creatinine Clearance  CrCl cannot be calculated (Patient's most recent lab result is older than the maximum 30 days allowed.).    ABG        Radiology  No radiology results for the last day                The following portions of the patient's history were reviewed and updated as appropriate: allergies, current medications, past family history, past medical history, past social history, past surgical history, and problem list.    Review of Systems   Constitutional: Positive for malaise/fatigue.   Cardiovascular:  Positive for leg swelling. Negative for chest pain, dyspnea on exertion and palpitations.   Respiratory:  Negative for cough and shortness of breath.    Gastrointestinal:  Negative for abdominal pain, nausea and vomiting.   Neurological:  Positive for weakness. Negative for dizziness, focal weakness, headaches, light-headedness and numbness.   All other systems reviewed and are negative.      Current Outpatient Medications:     dilTIAZem CD (CARDIZEM CD) 240 MG 24 hr capsule, TAKE 1 CAPSULE BY MOUTH EVERY MORNING, Disp: 90 capsule, Rfl: 3    atorvastatin (LIPITOR) 10 MG tablet, TAKE 1 TABLET BY MOUTH EVERY NIGHT AT BEDTIME, Disp: 90 tablet, Rfl: 3    metoprolol succinate XL (TOPROL-XL) 25 MG 24 hr tablet, TAKE 1 TABLET BY MOUTH EVERY MORNING, Disp: 90 tablet, Rfl: 3    rivaroxaban (Xarelto) 10 MG tablet, Take 1 tablet by mouth Daily., Disp: 35 tablet, Rfl: 0    triamterene-hydrochlorothiazide (MAXZIDE-25) 37.5-25 MG per tablet, TAKE 1 TABLET BY MOUTH EVERY MORNING, Disp: 90 tablet, Rfl: 1    vitamin D (ERGOCALCIFEROL) 05587 units capsule capsule, TK 1 C PO Q WK, Disp: , Rfl: 1    warfarin (COUMADIN) 3 MG tablet, Take 3 mg by mouth Daily. 1.5mg  M W F   1mg  S T R Sa, Disp: , Rfl:     No Known Allergies    Family History   Problem Relation Age of Onset    Hypertension Other     Stroke Other     Mental illness Other        Past  Surgical History:   Procedure Laterality Date    CARDIAC CATHETERIZATION  10/2014       Past Medical History:   Diagnosis Date    Atrial fibrillation     Hyperlipidemia     Hypertension     No known drug allergy        Family History   Problem Relation Age of Onset    Hypertension Other     Stroke Other     Mental illness Other        Social History     Socioeconomic History    Marital status:    Tobacco Use    Smoking status: Never    Smokeless tobacco: Never   Vaping Use    Vaping Use: Never used   Substance and Sexual Activity    Alcohol use: No    Drug use: Never    Sexual activity: Defer           ECG 12 Lead    Date/Time: 8/31/2023 10:46 AM  Performed by: Isa Montejo MD  Authorized by: Isa Montejo MD   Comparison: compared with previous ECG   Similar to previous ECG  Comparison to previous ECG: Atrial fibrillation with controlled ventricular response 57/min nonspecific ST-T wave changes normal axis normal intervals no ectopy no significant change from 2/21/2023        Objective:       Physical Exam    There were no vitals taken for this visit.  The patient is alert, oriented and in no distress.    Vital signs as noted above.    Head and neck revealed no carotid bruits or jugular venous distension.  No thyromegaly or lymphadenopathy is present.    Lungs clear.  No wheezing.  Breath sounds are normal bilaterally.    Heart normal first and second heart sounds.  No murmur..  No pericardial rub is present.  No gallop is present.    Abdomen soft and nontender.  No organomegaly is present.    Extremities revealed good peripheral pulses without any pedal edema.    Skin warm and dry.    Musculoskeletal system is grossly normal.    CNS grossly normal.    Reviewed and updated.

## 2023-08-31 ENCOUNTER — OFFICE VISIT (OUTPATIENT)
Dept: CARDIOLOGY | Facility: CLINIC | Age: 88
End: 2023-08-31
Payer: MEDICARE

## 2023-08-31 ENCOUNTER — ANTICOAGULATION VISIT (OUTPATIENT)
Dept: CARDIOLOGY | Facility: CLINIC | Age: 88
End: 2023-08-31
Payer: MEDICARE

## 2023-08-31 VITALS
WEIGHT: 156 LBS | BODY MASS INDEX: 26.63 KG/M2 | HEART RATE: 50 BPM | SYSTOLIC BLOOD PRESSURE: 123 MMHG | DIASTOLIC BLOOD PRESSURE: 73 MMHG | OXYGEN SATURATION: 96 % | HEIGHT: 64 IN

## 2023-08-31 VITALS
WEIGHT: 156 LBS | DIASTOLIC BLOOD PRESSURE: 73 MMHG | SYSTOLIC BLOOD PRESSURE: 123 MMHG | BODY MASS INDEX: 26.78 KG/M2 | HEART RATE: 50 BPM

## 2023-08-31 DIAGNOSIS — Z79.01 CHRONIC ANTICOAGULATION: ICD-10-CM

## 2023-08-31 DIAGNOSIS — I48.20 CHRONIC ATRIAL FIBRILLATION: Primary | ICD-10-CM

## 2023-08-31 DIAGNOSIS — I10 ESSENTIAL HYPERTENSION: ICD-10-CM

## 2023-08-31 DIAGNOSIS — E78.5 DYSLIPIDEMIA: ICD-10-CM

## 2023-08-31 LAB — INR PPP: 1.7 (ref 2–3)

## 2023-08-31 PROCEDURE — 85610 PROTHROMBIN TIME: CPT | Performed by: INTERNAL MEDICINE

## 2023-08-31 PROCEDURE — 36416 COLLJ CAPILLARY BLOOD SPEC: CPT | Performed by: INTERNAL MEDICINE

## 2023-09-20 ENCOUNTER — ANTICOAGULATION VISIT (OUTPATIENT)
Dept: CARDIOLOGY | Facility: CLINIC | Age: 88
End: 2023-09-20
Payer: MEDICARE

## 2023-09-20 VITALS
WEIGHT: 157 LBS | BODY MASS INDEX: 26.95 KG/M2 | SYSTOLIC BLOOD PRESSURE: 149 MMHG | DIASTOLIC BLOOD PRESSURE: 68 MMHG | HEART RATE: 67 BPM

## 2023-09-20 DIAGNOSIS — I48.20 CHRONIC ATRIAL FIBRILLATION: Primary | ICD-10-CM

## 2023-09-20 DIAGNOSIS — Z79.01 CHRONIC ANTICOAGULATION: ICD-10-CM

## 2023-09-20 LAB — INR PPP: 1.9 (ref 2–3)

## 2023-09-20 PROCEDURE — 85610 PROTHROMBIN TIME: CPT | Performed by: INTERNAL MEDICINE

## 2023-09-20 PROCEDURE — 36416 COLLJ CAPILLARY BLOOD SPEC: CPT | Performed by: INTERNAL MEDICINE

## 2023-10-19 ENCOUNTER — ANTICOAGULATION VISIT (OUTPATIENT)
Dept: CARDIOLOGY | Facility: CLINIC | Age: 88
End: 2023-10-19
Payer: MEDICARE

## 2023-10-19 VITALS — DIASTOLIC BLOOD PRESSURE: 64 MMHG | SYSTOLIC BLOOD PRESSURE: 132 MMHG | HEART RATE: 66 BPM

## 2023-10-19 DIAGNOSIS — Z79.01 CHRONIC ANTICOAGULATION: ICD-10-CM

## 2023-10-19 DIAGNOSIS — I48.20 CHRONIC ATRIAL FIBRILLATION: Primary | ICD-10-CM

## 2023-10-19 LAB — INR PPP: 1.8 (ref 2–3)

## 2023-10-19 PROCEDURE — 36416 COLLJ CAPILLARY BLOOD SPEC: CPT | Performed by: INTERNAL MEDICINE

## 2023-10-19 PROCEDURE — 85610 PROTHROMBIN TIME: CPT | Performed by: INTERNAL MEDICINE

## 2023-10-19 RX ORDER — AMOXICILLIN AND CLAVULANATE POTASSIUM 875; 125 MG/1; MG/1
1 TABLET, FILM COATED ORAL EVERY 12 HOURS SCHEDULED
COMMUNITY
Start: 2023-10-02

## 2023-11-15 ENCOUNTER — ANTICOAGULATION VISIT (OUTPATIENT)
Dept: CARDIOLOGY | Facility: CLINIC | Age: 88
End: 2023-11-15
Payer: MEDICARE

## 2023-11-15 VITALS — DIASTOLIC BLOOD PRESSURE: 60 MMHG | SYSTOLIC BLOOD PRESSURE: 160 MMHG | HEART RATE: 74 BPM

## 2023-11-15 DIAGNOSIS — Z79.01 CHRONIC ANTICOAGULATION: ICD-10-CM

## 2023-11-15 DIAGNOSIS — I48.20 CHRONIC ATRIAL FIBRILLATION: Primary | ICD-10-CM

## 2023-11-15 LAB — INR PPP: 2 (ref 2–3)

## 2023-11-15 PROCEDURE — 36416 COLLJ CAPILLARY BLOOD SPEC: CPT | Performed by: INTERNAL MEDICINE

## 2023-11-15 PROCEDURE — 85610 PROTHROMBIN TIME: CPT | Performed by: INTERNAL MEDICINE

## 2023-12-14 RX ORDER — DILTIAZEM HYDROCHLORIDE 240 MG/1
CAPSULE, COATED, EXTENDED RELEASE ORAL
Qty: 90 CAPSULE | Refills: 3 | Status: SHIPPED | OUTPATIENT
Start: 2023-12-14

## 2023-12-14 RX ORDER — ATORVASTATIN CALCIUM 10 MG/1
10 TABLET, FILM COATED ORAL
Qty: 90 TABLET | Refills: 3 | Status: SHIPPED | OUTPATIENT
Start: 2023-12-14

## 2023-12-14 NOTE — TELEPHONE ENCOUNTER
Rx Refill Note  Requested Prescriptions     Pending Prescriptions Disp Refills    atorvastatin (LIPITOR) 10 MG tablet [Pharmacy Med Name: ATORVASTATIN 10MG TABLETS] 90 tablet 3     Sig: TAKE 1 TABLET BY MOUTH EVERY NIGHT AT BEDTIME    dilTIAZem CD (CARDIZEM CD) 240 MG 24 hr capsule [Pharmacy Med Name: DILTIAZEM CD 240MG CAPSULES (24 HR)] 90 capsule 3     Sig: TAKE 1 CAPSULE BY MOUTH EVERY MORNING      Last office visit with prescribing clinician: 8/31/2023   Last telemedicine visit with prescribing clinician: Visit date not found   Next office visit with prescribing clinician: 3/7/2024                         Would you like a call back once the refill request has been completed: [] Yes [] No    If the office needs to give you a call back, can they leave a voicemail: [] Yes [] No    Steff Camilo MA  12/14/23, 08:53 EST

## 2023-12-19 ENCOUNTER — ANTICOAGULATION VISIT (OUTPATIENT)
Dept: CARDIOLOGY | Facility: CLINIC | Age: 88
End: 2023-12-19
Payer: MEDICARE

## 2023-12-19 VITALS — SYSTOLIC BLOOD PRESSURE: 150 MMHG | HEART RATE: 75 BPM | DIASTOLIC BLOOD PRESSURE: 89 MMHG

## 2023-12-19 DIAGNOSIS — Z79.01 CHRONIC ANTICOAGULATION: ICD-10-CM

## 2023-12-19 DIAGNOSIS — I48.20 CHRONIC ATRIAL FIBRILLATION: Primary | ICD-10-CM

## 2023-12-19 LAB — INR PPP: 2.3 (ref 2–3)

## 2023-12-19 PROCEDURE — 85610 PROTHROMBIN TIME: CPT | Performed by: INTERNAL MEDICINE

## 2023-12-19 PROCEDURE — 36416 COLLJ CAPILLARY BLOOD SPEC: CPT | Performed by: INTERNAL MEDICINE

## 2024-01-31 ENCOUNTER — ANTICOAGULATION VISIT (OUTPATIENT)
Dept: CARDIOLOGY | Facility: CLINIC | Age: 89
End: 2024-01-31
Payer: MEDICARE

## 2024-01-31 VITALS — DIASTOLIC BLOOD PRESSURE: 66 MMHG | HEART RATE: 62 BPM | SYSTOLIC BLOOD PRESSURE: 139 MMHG

## 2024-01-31 DIAGNOSIS — I48.20 CHRONIC ATRIAL FIBRILLATION: Primary | ICD-10-CM

## 2024-01-31 DIAGNOSIS — Z79.01 CHRONIC ANTICOAGULATION: ICD-10-CM

## 2024-01-31 LAB — INR PPP: 2.4 (ref 2–3)

## 2024-01-31 PROCEDURE — 36416 COLLJ CAPILLARY BLOOD SPEC: CPT | Performed by: INTERNAL MEDICINE

## 2024-01-31 PROCEDURE — 85610 PROTHROMBIN TIME: CPT | Performed by: INTERNAL MEDICINE

## 2024-02-23 NOTE — PROGRESS NOTES
Encounter Date:03/07/2024  Last seen 8/31/2023      Patient ID: Nir Mendez is a 88 y.o. female.    Chief Complaint:     Atrial fibrillation  Anticoagulation management  Hypertension  Dyslipidemia        History of Present Illness  Since I have last seen, the patient has been without any chest discomfort ,shortness of breath, palpitations, dizziness or syncope.  Denies having any headache ,abdominal pain ,nausea, vomiting , diarrhea constipation, loss of weight or loss of appetite.  Denies having any excessive bruising ,hematuria or blood in the stool.    Review of all systems negative except as indicated.    Reviewed ROS.  Assessment and Plan         /////////////////////  History  ----------------------  -Chronic atrial fibrillation.  Patient has converted to sinus rhythm but did not stay in sinus rhythm.       -anticoagulation -on Coumadin     -Hypertension and dyslipidemia     -history of normal coronary arteries and normal left ventricular function 10/31/2014.  -----------  Plan  -----------  Chronic atrial fibrillation-rate is well controlled  EKG showed atrial fibrillation with controlled ventricular response-.  8/31/2023  EKG 3/7/2024-atrial fibrillation with controlled ventricular response.    Patient is not having any angina pectoris or congestive heart failure.  Continue metoprolol once a day and Cardizem CD     Anticoagulation status reviewed  Patient is on Coumadin.  PT/INR on a monthly basis.  INR today 1.9.  Adjust dosage.     Hypertension-  well controlled.  145/74  Continue Cardizem CD metoprolol triamterene hydrochlorothiazide     Dyslipidemia-continue atorvastatin.     Medications were reviewed and updated.     Followup in the office in 6 months.     Further plan will depend on patient's progress.     Reviewed and updated-3/7/2024.  /////////////          Diagnosis Plan   1. Chronic atrial fibrillation        2. Chronic anticoagulation        3. Essential hypertension        4. Dyslipidemia         LAB RESULTS (LAST 7 DAYS)    CBC        BMP        CMP         BNP        TROPONIN        CoAg        Creatinine Clearance  CrCl cannot be calculated (Patient's most recent lab result is older than the maximum 30 days allowed.).    ABG        Radiology  No radiology results for the last day                The following portions of the patient's history were reviewed and updated as appropriate: allergies, current medications, past family history, past medical history, past social history, past surgical history, and problem list.    Review of Systems   Constitutional: Negative for malaise/fatigue.   Cardiovascular:  Negative for chest pain, leg swelling and palpitations.   Respiratory:  Negative for shortness of breath.    Skin:  Negative for rash.   Neurological:  Negative for dizziness, light-headedness and numbness.       Current Outpatient Medications:     amoxicillin-clavulanate (AUGMENTIN) 875-125 MG per tablet, Take 1 tablet by mouth Every 12 (Twelve) Hours., Disp: , Rfl:     atorvastatin (LIPITOR) 10 MG tablet, TAKE 1 TABLET BY MOUTH EVERY NIGHT AT BEDTIME, Disp: 90 tablet, Rfl: 3    dilTIAZem CD (CARDIZEM CD) 240 MG 24 hr capsule, TAKE 1 CAPSULE BY MOUTH EVERY MORNING, Disp: 90 capsule, Rfl: 3    metoprolol tartrate (LOPRESSOR) 25 MG tablet, Take 1 tablet by mouth Daily., Disp: , Rfl:     rivaroxaban (Xarelto) 10 MG tablet, Take 1 tablet by mouth Daily., Disp: 35 tablet, Rfl: 0    triamterene-hydrochlorothiazide (MAXZIDE-25) 37.5-25 MG per tablet, TAKE 1 TABLET BY MOUTH EVERY MORNING, Disp: 90 tablet, Rfl: 1    vitamin D (ERGOCALCIFEROL) 39156 units capsule capsule, TK 1 C PO Q WK, Disp: , Rfl: 1    warfarin (COUMADIN) 3 MG tablet, Take 1 tablet by mouth Daily. 1.5mg  M W F   1mg  S T R Sa, Disp: , Rfl:     No Known Allergies    Family History   Problem Relation Age of Onset    Hypertension Other     Stroke Other     Mental illness Other        Past Surgical History:   Procedure Laterality Date    CARDIAC  CATHETERIZATION  10/2014       Past Medical History:   Diagnosis Date    Atrial fibrillation     Hyperlipidemia     Hypertension     No known drug allergy        Family History   Problem Relation Age of Onset    Hypertension Other     Stroke Other     Mental illness Other        Social History     Socioeconomic History    Marital status:    Tobacco Use    Smoking status: Never    Smokeless tobacco: Never   Vaping Use    Vaping Use: Never used   Substance and Sexual Activity    Alcohol use: No    Drug use: Never    Sexual activity: Defer           ECG 12 Lead    Date/Time: 3/7/2024 10:14 AM  Performed by: Isa Montejo MD    Authorized by: Isa Montejo MD  Comparison: compared with previous ECG   Similar to previous ECG  Comparison to previous ECG: Atrial fibrillation with controlled ventricular response nonspecific ST-T wave changes 62/min normal axis normal intervals no ectopy no significant change from previous EKG.        Objective:       Physical Exam    There were no vitals taken for this visit.  The patient is alert, oriented and in no distress.    Vital signs as noted above.    Head and neck revealed no carotid bruits or jugular venous distension.  No thyromegaly or lymphadenopathy is present.    Lungs clear.  No wheezing.  Breath sounds are normal bilaterally.    Heart normal first and second heart sounds.  No murmur..  No pericardial rub is present.  No gallop is present.    Abdomen soft and nontender.  No organomegaly is present.    Extremities revealed good peripheral pulses without any pedal edema.    Skin warm and dry.    Musculoskeletal system is grossly normal.    CNS grossly normal.    Reviewed and updated.

## 2024-03-07 ENCOUNTER — ANTICOAGULATION VISIT (OUTPATIENT)
Dept: CARDIOLOGY | Facility: CLINIC | Age: 89
End: 2024-03-07
Payer: MEDICARE

## 2024-03-07 ENCOUNTER — OFFICE VISIT (OUTPATIENT)
Dept: CARDIOLOGY | Facility: CLINIC | Age: 89
End: 2024-03-07
Payer: MEDICARE

## 2024-03-07 VITALS
WEIGHT: 155 LBS | HEIGHT: 64 IN | HEART RATE: 64 BPM | DIASTOLIC BLOOD PRESSURE: 74 MMHG | SYSTOLIC BLOOD PRESSURE: 145 MMHG | BODY MASS INDEX: 26.46 KG/M2

## 2024-03-07 VITALS
BODY MASS INDEX: 26.61 KG/M2 | DIASTOLIC BLOOD PRESSURE: 74 MMHG | HEART RATE: 65 BPM | WEIGHT: 155 LBS | SYSTOLIC BLOOD PRESSURE: 145 MMHG

## 2024-03-07 DIAGNOSIS — I48.20 CHRONIC ATRIAL FIBRILLATION: Primary | ICD-10-CM

## 2024-03-07 DIAGNOSIS — I10 ESSENTIAL HYPERTENSION: ICD-10-CM

## 2024-03-07 DIAGNOSIS — Z79.01 CHRONIC ANTICOAGULATION: ICD-10-CM

## 2024-03-07 DIAGNOSIS — E78.5 DYSLIPIDEMIA: ICD-10-CM

## 2024-03-07 LAB — INR PPP: 1.9 (ref 2–3)

## 2024-03-07 PROCEDURE — 85610 PROTHROMBIN TIME: CPT | Performed by: INTERNAL MEDICINE

## 2024-03-07 PROCEDURE — 36416 COLLJ CAPILLARY BLOOD SPEC: CPT | Performed by: INTERNAL MEDICINE

## 2024-04-04 ENCOUNTER — TELEPHONE (OUTPATIENT)
Dept: CARDIOLOGY | Facility: CLINIC | Age: 89
End: 2024-04-04
Payer: COMMERCIAL

## 2024-04-04 NOTE — TELEPHONE ENCOUNTER
Pt has a rash on her chest started 2-3 days ago concerned it could be from warfarin. Advised pt it isn't likely the warfarin is causing the rash since it just showed up and she has been on warfarin for several months. Suggested pt follow up with PCP advised pt I can check her INR sooner then the next week appt if she has any concerns. She denies bruising/bleeding and describes the rash as red and raised apLPN

## 2024-04-04 NOTE — TELEPHONE ENCOUNTER
"Pt called wanting to speak to Maria Ines RE: \"changes\" she was having, possibly related to Warfarin. [phone]. cjRN  "

## 2024-04-11 ENCOUNTER — ANTICOAGULATION VISIT (OUTPATIENT)
Dept: CARDIOLOGY | Facility: CLINIC | Age: 89
End: 2024-04-11
Payer: MEDICARE

## 2024-04-11 VITALS
BODY MASS INDEX: 26.95 KG/M2 | HEART RATE: 62 BPM | SYSTOLIC BLOOD PRESSURE: 151 MMHG | WEIGHT: 157 LBS | DIASTOLIC BLOOD PRESSURE: 74 MMHG

## 2024-04-11 DIAGNOSIS — I48.20 CHRONIC ATRIAL FIBRILLATION: Primary | ICD-10-CM

## 2024-04-11 DIAGNOSIS — Z79.01 CHRONIC ANTICOAGULATION: ICD-10-CM

## 2024-04-11 LAB — INR PPP: 1.6 (ref 2–3)

## 2024-04-11 PROCEDURE — 36416 COLLJ CAPILLARY BLOOD SPEC: CPT | Performed by: INTERNAL MEDICINE

## 2024-04-11 PROCEDURE — 85610 PROTHROMBIN TIME: CPT | Performed by: INTERNAL MEDICINE

## 2024-05-02 ENCOUNTER — ANTICOAGULATION VISIT (OUTPATIENT)
Dept: CARDIOLOGY | Facility: CLINIC | Age: 89
End: 2024-05-02
Payer: MEDICARE

## 2024-05-02 VITALS
HEART RATE: 73 BPM | WEIGHT: 156 LBS | SYSTOLIC BLOOD PRESSURE: 157 MMHG | DIASTOLIC BLOOD PRESSURE: 62 MMHG | BODY MASS INDEX: 26.78 KG/M2

## 2024-05-02 DIAGNOSIS — Z79.01 CHRONIC ANTICOAGULATION: ICD-10-CM

## 2024-05-02 DIAGNOSIS — I48.20 CHRONIC ATRIAL FIBRILLATION: Primary | ICD-10-CM

## 2024-05-02 LAB — INR PPP: 1.7 (ref 2–3)

## 2024-05-02 PROCEDURE — 85610 PROTHROMBIN TIME: CPT | Performed by: INTERNAL MEDICINE

## 2024-05-02 PROCEDURE — 36416 COLLJ CAPILLARY BLOOD SPEC: CPT | Performed by: INTERNAL MEDICINE

## 2024-05-22 ENCOUNTER — ANTICOAGULATION VISIT (OUTPATIENT)
Dept: CARDIOLOGY | Facility: CLINIC | Age: 89
End: 2024-05-22
Payer: MEDICARE

## 2024-05-22 VITALS
WEIGHT: 157 LBS | HEART RATE: 65 BPM | DIASTOLIC BLOOD PRESSURE: 72 MMHG | BODY MASS INDEX: 26.95 KG/M2 | SYSTOLIC BLOOD PRESSURE: 151 MMHG

## 2024-05-22 DIAGNOSIS — Z79.01 CHRONIC ANTICOAGULATION: ICD-10-CM

## 2024-05-22 DIAGNOSIS — I48.20 CHRONIC ATRIAL FIBRILLATION: Primary | ICD-10-CM

## 2024-05-22 LAB — INR PPP: 1.9 (ref 2–3)

## 2024-05-22 PROCEDURE — 85610 PROTHROMBIN TIME: CPT | Performed by: INTERNAL MEDICINE

## 2024-05-22 PROCEDURE — 36416 COLLJ CAPILLARY BLOOD SPEC: CPT | Performed by: INTERNAL MEDICINE

## 2024-05-23 ENCOUNTER — HOSPITAL ENCOUNTER (INPATIENT)
Facility: HOSPITAL | Age: 89
LOS: 2 days | Discharge: HOME | DRG: 193 | End: 2024-05-26
Attending: INTERNAL MEDICINE | Admitting: INTERNAL MEDICINE
Payer: MEDICARE

## 2024-05-23 ENCOUNTER — APPOINTMENT (OUTPATIENT)
Dept: GENERAL RADIOLOGY | Facility: HOSPITAL | Age: 89
DRG: 193 | End: 2024-05-23
Payer: MEDICARE

## 2024-05-23 DIAGNOSIS — I50.9 ACUTE ON CHRONIC CONGESTIVE HEART FAILURE, UNSPECIFIED HEART FAILURE TYPE: Primary | ICD-10-CM

## 2024-05-23 DIAGNOSIS — J20.8 ACUTE BRONCHITIS DUE TO HUMAN METAPNEUMOVIRUS (HMPV): ICD-10-CM

## 2024-05-23 DIAGNOSIS — R09.02 HYPOXIC: ICD-10-CM

## 2024-05-23 DIAGNOSIS — B97.81 ACUTE BRONCHITIS DUE TO HUMAN METAPNEUMOVIRUS (HMPV): ICD-10-CM

## 2024-05-23 PROBLEM — J12.3 PNEUMONIA DUE TO HUMAN METAPNEUMOVIRUS: Status: ACTIVE | Noted: 2024-05-23

## 2024-05-23 LAB
ALBUMIN SERPL-MCNC: 4.2 G/DL (ref 3.5–5.2)
ALBUMIN/GLOB SERPL: 1.3 G/DL
ALP SERPL-CCNC: 70 U/L (ref 39–117)
ALT SERPL W P-5'-P-CCNC: 13 U/L (ref 1–33)
ANION GAP SERPL CALCULATED.3IONS-SCNC: 10.8 MMOL/L (ref 5–15)
AST SERPL-CCNC: 29 U/L (ref 1–32)
B PARAPERT DNA SPEC QL NAA+PROBE: NOT DETECTED
B PERT DNA SPEC QL NAA+PROBE: NOT DETECTED
BACTERIA UR QL AUTO: ABNORMAL /HPF
BASOPHILS # BLD AUTO: 0.02 10*3/MM3 (ref 0–0.2)
BASOPHILS NFR BLD AUTO: 0.3 % (ref 0–1.5)
BILIRUB SERPL-MCNC: 0.8 MG/DL (ref 0–1.2)
BILIRUB UR QL STRIP: NEGATIVE
BUN SERPL-MCNC: 18 MG/DL (ref 8–23)
BUN/CREAT SERPL: 15.4 (ref 7–25)
C PNEUM DNA NPH QL NAA+NON-PROBE: NOT DETECTED
CALCIUM SPEC-SCNC: 9.6 MG/DL (ref 8.6–10.5)
CHLORIDE SERPL-SCNC: 102 MMOL/L (ref 98–107)
CLARITY UR: ABNORMAL
CO2 SERPL-SCNC: 25.2 MMOL/L (ref 22–29)
COLOR UR: YELLOW
CREAT SERPL-MCNC: 1.17 MG/DL (ref 0.57–1)
DEPRECATED RDW RBC AUTO: 41.5 FL (ref 37–54)
EGFRCR SERPLBLD CKD-EPI 2021: 45.3 ML/MIN/1.73
EOSINOPHIL # BLD AUTO: 0.05 10*3/MM3 (ref 0–0.4)
EOSINOPHIL NFR BLD AUTO: 0.9 % (ref 0.3–6.2)
ERYTHROCYTE [DISTWIDTH] IN BLOOD BY AUTOMATED COUNT: 12.7 % (ref 12.3–15.4)
FLUAV SUBTYP SPEC NAA+PROBE: NOT DETECTED
FLUBV RNA ISLT QL NAA+PROBE: NOT DETECTED
GLOBULIN UR ELPH-MCNC: 3.3 GM/DL
GLUCOSE SERPL-MCNC: 102 MG/DL (ref 65–99)
GLUCOSE UR STRIP-MCNC: NEGATIVE MG/DL
HADV DNA SPEC NAA+PROBE: NOT DETECTED
HCOV 229E RNA SPEC QL NAA+PROBE: NOT DETECTED
HCOV HKU1 RNA SPEC QL NAA+PROBE: NOT DETECTED
HCOV NL63 RNA SPEC QL NAA+PROBE: NOT DETECTED
HCOV OC43 RNA SPEC QL NAA+PROBE: NOT DETECTED
HCT VFR BLD AUTO: 39 % (ref 34–46.6)
HGB BLD-MCNC: 12.7 G/DL (ref 12–15.9)
HGB UR QL STRIP.AUTO: ABNORMAL
HMPV RNA NPH QL NAA+NON-PROBE: DETECTED
HPIV1 RNA ISLT QL NAA+PROBE: NOT DETECTED
HPIV2 RNA SPEC QL NAA+PROBE: NOT DETECTED
HPIV3 RNA NPH QL NAA+PROBE: NOT DETECTED
HPIV4 P GENE NPH QL NAA+PROBE: NOT DETECTED
HYALINE CASTS UR QL AUTO: ABNORMAL /LPF
IMM GRANULOCYTES # BLD AUTO: 0.01 10*3/MM3 (ref 0–0.05)
IMM GRANULOCYTES NFR BLD AUTO: 0.2 % (ref 0–0.5)
INR PPP: 1.71 (ref 2–3)
KETONES UR QL STRIP: ABNORMAL
LEUKOCYTE ESTERASE UR QL STRIP.AUTO: ABNORMAL
LYMPHOCYTES # BLD AUTO: 1.81 10*3/MM3 (ref 0.7–3.1)
LYMPHOCYTES NFR BLD AUTO: 31.2 % (ref 19.6–45.3)
M PNEUMO IGG SER IA-ACNC: NOT DETECTED
MCH RBC QN AUTO: 28.9 PG (ref 26.6–33)
MCHC RBC AUTO-ENTMCNC: 32.6 G/DL (ref 31.5–35.7)
MCV RBC AUTO: 88.6 FL (ref 79–97)
MONOCYTES # BLD AUTO: 0.32 10*3/MM3 (ref 0.1–0.9)
MONOCYTES NFR BLD AUTO: 5.5 % (ref 5–12)
NEUTROPHILS NFR BLD AUTO: 3.59 10*3/MM3 (ref 1.7–7)
NEUTROPHILS NFR BLD AUTO: 61.9 % (ref 42.7–76)
NITRITE UR QL STRIP: NEGATIVE
NRBC BLD AUTO-RTO: 0 /100 WBC (ref 0–0.2)
NT-PROBNP SERPL-MCNC: 4278 PG/ML (ref 0–1800)
PH UR STRIP.AUTO: 6 [PH] (ref 5–8)
PLATELET # BLD AUTO: 218 10*3/MM3 (ref 140–450)
PMV BLD AUTO: 9.6 FL (ref 6–12)
POTASSIUM SERPL-SCNC: 3.8 MMOL/L (ref 3.5–5.2)
PROT SERPL-MCNC: 7.5 G/DL (ref 6–8.5)
PROT UR QL STRIP: ABNORMAL
PROTHROMBIN TIME: 17.9 SECONDS (ref 19.4–28.5)
RBC # BLD AUTO: 4.4 10*6/MM3 (ref 3.77–5.28)
RBC # UR STRIP: ABNORMAL /HPF
REF LAB TEST METHOD: ABNORMAL
RHINOVIRUS RNA SPEC NAA+PROBE: NOT DETECTED
RSV RNA NPH QL NAA+NON-PROBE: NOT DETECTED
SARS-COV-2 RNA NPH QL NAA+NON-PROBE: NOT DETECTED
SODIUM SERPL-SCNC: 138 MMOL/L (ref 136–145)
SP GR UR STRIP: 1.02 (ref 1–1.03)
SQUAMOUS #/AREA URNS HPF: ABNORMAL /HPF
UROBILINOGEN UR QL STRIP: ABNORMAL
WBC # UR STRIP: ABNORMAL /HPF
WBC NRBC COR # BLD AUTO: 5.8 10*3/MM3 (ref 3.4–10.8)

## 2024-05-23 PROCEDURE — 80053 COMPREHEN METABOLIC PANEL: CPT | Performed by: PHYSICIAN ASSISTANT

## 2024-05-23 PROCEDURE — 85025 COMPLETE CBC W/AUTO DIFF WBC: CPT | Performed by: PHYSICIAN ASSISTANT

## 2024-05-23 PROCEDURE — 94799 UNLISTED PULMONARY SVC/PX: CPT

## 2024-05-23 PROCEDURE — 63710000001 PREDNISONE PER 1 MG: Performed by: INTERNAL MEDICINE

## 2024-05-23 PROCEDURE — 63710000001 PREDNISONE PER 5 MG: Performed by: INTERNAL MEDICINE

## 2024-05-23 PROCEDURE — 94664 DEMO&/EVAL PT USE INHALER: CPT

## 2024-05-23 PROCEDURE — 94640 AIRWAY INHALATION TREATMENT: CPT

## 2024-05-23 PROCEDURE — 85610 PROTHROMBIN TIME: CPT | Performed by: PHYSICIAN ASSISTANT

## 2024-05-23 PROCEDURE — 83880 ASSAY OF NATRIURETIC PEPTIDE: CPT | Performed by: PHYSICIAN ASSISTANT

## 2024-05-23 PROCEDURE — 94761 N-INVAS EAR/PLS OXIMETRY MLT: CPT

## 2024-05-23 PROCEDURE — 99285 EMERGENCY DEPT VISIT HI MDM: CPT

## 2024-05-23 PROCEDURE — G0378 HOSPITAL OBSERVATION PER HR: HCPCS

## 2024-05-23 PROCEDURE — 25010000002 METHYLPREDNISOLONE PER 125 MG: Performed by: PHYSICIAN ASSISTANT

## 2024-05-23 PROCEDURE — 25010000002 FUROSEMIDE PER 20 MG: Performed by: PHYSICIAN ASSISTANT

## 2024-05-23 PROCEDURE — 81001 URINALYSIS AUTO W/SCOPE: CPT | Performed by: PHYSICIAN ASSISTANT

## 2024-05-23 PROCEDURE — 71045 X-RAY EXAM CHEST 1 VIEW: CPT

## 2024-05-23 PROCEDURE — 0202U NFCT DS 22 TRGT SARS-COV-2: CPT | Performed by: PHYSICIAN ASSISTANT

## 2024-05-23 RX ORDER — METHYLPREDNISOLONE SODIUM SUCCINATE 125 MG/2ML
125 INJECTION, POWDER, LYOPHILIZED, FOR SOLUTION INTRAMUSCULAR; INTRAVENOUS ONCE
Status: COMPLETED | OUTPATIENT
Start: 2024-05-23 | End: 2024-05-23

## 2024-05-23 RX ORDER — FUROSEMIDE 10 MG/ML
80 INJECTION INTRAMUSCULAR; INTRAVENOUS ONCE
Status: COMPLETED | OUTPATIENT
Start: 2024-05-23 | End: 2024-05-23

## 2024-05-23 RX ORDER — PREDNISONE 10 MG/1
10 TABLET ORAL DAILY
Status: DISCONTINUED | OUTPATIENT
Start: 2024-05-27 | End: 2024-05-26 | Stop reason: HOSPADM

## 2024-05-23 RX ORDER — DILTIAZEM HYDROCHLORIDE 240 MG/1
240 CAPSULE, COATED, EXTENDED RELEASE ORAL EVERY MORNING
Status: DISCONTINUED | OUTPATIENT
Start: 2024-05-24 | End: 2024-05-26 | Stop reason: HOSPADM

## 2024-05-23 RX ORDER — IPRATROPIUM BROMIDE AND ALBUTEROL SULFATE 2.5; .5 MG/3ML; MG/3ML
3 SOLUTION RESPIRATORY (INHALATION)
Status: DISCONTINUED | OUTPATIENT
Start: 2024-05-23 | End: 2024-05-26 | Stop reason: HOSPADM

## 2024-05-23 RX ORDER — WARFARIN SODIUM 3 MG/1
TABLET ORAL SEE ADMIN INSTRUCTIONS
COMMUNITY
End: 2024-05-26 | Stop reason: HOSPADM

## 2024-05-23 RX ORDER — SODIUM CHLORIDE 9 MG/ML
40 INJECTION, SOLUTION INTRAVENOUS AS NEEDED
Status: DISCONTINUED | OUTPATIENT
Start: 2024-05-23 | End: 2024-05-26 | Stop reason: HOSPADM

## 2024-05-23 RX ORDER — SODIUM CHLORIDE 0.9 % (FLUSH) 0.9 %
10 SYRINGE (ML) INJECTION AS NEEDED
Status: DISCONTINUED | OUTPATIENT
Start: 2024-05-23 | End: 2024-05-26 | Stop reason: HOSPADM

## 2024-05-23 RX ORDER — ACETAMINOPHEN 325 MG/1
650 TABLET ORAL EVERY 4 HOURS PRN
Status: DISCONTINUED | OUTPATIENT
Start: 2024-05-23 | End: 2024-05-26 | Stop reason: HOSPADM

## 2024-05-23 RX ORDER — PREDNISONE 20 MG/1
20 TABLET ORAL DAILY
Status: COMPLETED | OUTPATIENT
Start: 2024-05-25 | End: 2024-05-26

## 2024-05-23 RX ORDER — POLYETHYLENE GLYCOL 3350 17 G/17G
17 POWDER, FOR SOLUTION ORAL DAILY PRN
Status: DISCONTINUED | OUTPATIENT
Start: 2024-05-23 | End: 2024-05-26 | Stop reason: HOSPADM

## 2024-05-23 RX ORDER — WARFARIN SODIUM 6 MG/1
TABLET ORAL SEE ADMIN INSTRUCTIONS
COMMUNITY
End: 2024-05-26 | Stop reason: HOSPADM

## 2024-05-23 RX ORDER — SODIUM CHLORIDE 0.9 % (FLUSH) 0.9 %
10 SYRINGE (ML) INJECTION EVERY 12 HOURS SCHEDULED
Status: DISCONTINUED | OUTPATIENT
Start: 2024-05-23 | End: 2024-05-26 | Stop reason: HOSPADM

## 2024-05-23 RX ORDER — IPRATROPIUM BROMIDE AND ALBUTEROL SULFATE 2.5; .5 MG/3ML; MG/3ML
3 SOLUTION RESPIRATORY (INHALATION) ONCE
Status: COMPLETED | OUTPATIENT
Start: 2024-05-23 | End: 2024-05-23

## 2024-05-23 RX ORDER — AMOXICILLIN 250 MG
2 CAPSULE ORAL 2 TIMES DAILY PRN
Status: DISCONTINUED | OUTPATIENT
Start: 2024-05-23 | End: 2024-05-26 | Stop reason: HOSPADM

## 2024-05-23 RX ORDER — BISACODYL 5 MG/1
5 TABLET, DELAYED RELEASE ORAL DAILY PRN
Status: DISCONTINUED | OUTPATIENT
Start: 2024-05-23 | End: 2024-05-26 | Stop reason: HOSPADM

## 2024-05-23 RX ORDER — BISACODYL 10 MG
10 SUPPOSITORY, RECTAL RECTAL DAILY PRN
Status: DISCONTINUED | OUTPATIENT
Start: 2024-05-23 | End: 2024-05-26 | Stop reason: HOSPADM

## 2024-05-23 RX ORDER — FUROSEMIDE 40 MG/1
40 TABLET ORAL DAILY
Status: DISCONTINUED | OUTPATIENT
Start: 2024-05-24 | End: 2024-05-26 | Stop reason: HOSPADM

## 2024-05-23 RX ADMIN — METHYLPREDNISOLONE SODIUM SUCCINATE 125 MG: 125 INJECTION, POWDER, FOR SOLUTION INTRAMUSCULAR; INTRAVENOUS at 13:41

## 2024-05-23 RX ADMIN — FUROSEMIDE 80 MG: 10 INJECTION, SOLUTION INTRAMUSCULAR; INTRAVENOUS at 14:46

## 2024-05-23 RX ADMIN — IPRATROPIUM BROMIDE AND ALBUTEROL SULFATE 3 ML: .5; 3 SOLUTION RESPIRATORY (INHALATION) at 14:16

## 2024-05-23 RX ADMIN — PREDNISONE 30 MG: 20 TABLET ORAL at 21:47

## 2024-05-23 RX ADMIN — METOPROLOL TARTRATE 25 MG: 25 TABLET, FILM COATED ORAL at 21:47

## 2024-05-23 NOTE — Clinical Note
Level of Care: Telemetry [5]   Admitting Physician: CLIFFORD SALAZAR [[id number]]   Attending Physician: CLIFFORD SALAZAR [[id number]]

## 2024-05-23 NOTE — ED PROVIDER NOTES
Subjective   History of Present Illness  Patient is an 88-year-old female who presents with worsening cough congestion shortness of breath over the last week.  She is otherwise fairly active very healthy.        Review of Systems   Constitutional:  Negative for chills, diaphoresis, fatigue and fever.   HENT:  Negative for congestion, ear pain, sinus pressure, sore throat, trouble swallowing and voice change.    Respiratory:  Positive for cough, chest tightness and shortness of breath.    Cardiovascular:  Negative for chest pain and palpitations.   Gastrointestinal:  Negative for abdominal distention, nausea and vomiting.   Genitourinary: Negative.    Musculoskeletal: Negative.    Skin: Negative.    Neurological: Negative.    Psychiatric/Behavioral: Negative.         Past Medical History:   Diagnosis Date    Atrial fibrillation     Hyperlipidemia     Hypertension     No known drug allergy        No Known Allergies    Past Surgical History:   Procedure Laterality Date    CARDIAC CATHETERIZATION  10/2014       Family History   Problem Relation Age of Onset    Hypertension Other     Stroke Other     Mental illness Other        Social History     Socioeconomic History    Marital status:    Tobacco Use    Smoking status: Never    Smokeless tobacco: Never   Vaping Use    Vaping status: Never Used   Substance and Sexual Activity    Alcohol use: No    Drug use: Never    Sexual activity: Defer           Objective   Physical Exam  Constitutional:       General: She is not in acute distress.     Appearance: Normal appearance. She is well-developed. She is ill-appearing. She is not toxic-appearing or diaphoretic.   HENT:      Head: Normocephalic and atraumatic.      Nose: Nose normal.   Eyes:      Conjunctiva/sclera: Conjunctivae normal.   Cardiovascular:      Rate and Rhythm: Normal rate and regular rhythm.   Pulmonary:      Effort: Pulmonary effort is normal. No respiratory distress.      Breath sounds: No stridor.  "Rhonchi present. No wheezing or rales.   Musculoskeletal:         General: Normal range of motion.      Cervical back: Normal range of motion.   Skin:     General: Skin is warm and dry.   Neurological:      General: No focal deficit present.      Mental Status: She is alert and oriented to person, place, and time. Mental status is at baseline.   Psychiatric:         Mood and Affect: Mood normal.         Behavior: Behavior normal.         Thought Content: Thought content normal.         Judgment: Judgment normal.         Procedures           ED Course                                             Medical Decision Making  Appropriate PPE worn during exam.    /74 (BP Location: Right arm, Patient Position: Lying)   Pulse 70   Temp 98.5 °F (36.9 °C) (Oral)   Resp 18   Ht 157.5 cm (62\")   Wt 71.7 kg (158 lb)   SpO2 97%   BMI 28.90 kg/m²      Co-morbidities --  has a past medical history of Atrial fibrillation, Hyperlipidemia, Hypertension, and No known drug allergy.  Radiology interpretation --  X-rays reviewed by me and independently interpreted by radiologist:  XR Chest 1 View    Result Date: 5/23/2024  Impression: Mild to moderate CHF features. Superimposed left lower lobe pneumonia not excluded. Correlate with symptoms. Electronically Signed: Anibal Mota MD  5/23/2024 12:48 PM EDT  Workstation ID: [id number]      Patient is an 88-year-old female who presents with upper respiratory metapneumovirus.  Possible superimposed pneumonia on her chest x-ray but her white blood cell count was within normal range I think is most likely viral along with new onset CHF she was ordered breathing treatment steroids and Lasix.  I spoke with Dr. Trejo her family medicine doctor who will admit the patient  I discussed the findings and recommendations with the patient who voices understanding. Stable while in the ER.     Note Disclaimer: At [facility], we believe that sharing information builds trust and better " relationships. You are receiving this note because you are receiving care at [facility] or recently visited. It is possible you will see health information before a provider has talked with you about it. This kind of information can be easy to misunderstand. To help you fully understand what it means for your health, we urge you to discuss this note with your provider.        Problems Addressed:  Acute bronchitis due to human metapneumovirus (hMPV): complicated acute illness or injury  Acute on chronic congestive heart failure, unspecified heart failure type: complicated acute illness or injury  Hypoxic: complicated acute illness or injury    Amount and/or Complexity of Data Reviewed  Labs: ordered.  Radiology: ordered.    Risk  Prescription drug management.  Decision regarding hospitalization.        Final diagnoses:   Acute on chronic congestive heart failure, unspecified heart failure type   Acute bronchitis due to human metapneumovirus (hMPV)   Hypoxic       ED Disposition  ED Disposition       ED Disposition   Decision to Admit    Condition   --    Comment   Level of Care: Telemetry [5]   Admitting Physician: CLIFFORD SALAZAR [[id number]]   Attending Physician: CLIFFORD SALAZAR [[id number]]                 No follow-up provider specified.       Medication List      No changes were made to your prescriptions during this visit.            Farida Armas PA-C  05/23/24 1024

## 2024-05-23 NOTE — H&P
History and Physical      Name: Nir Mendez ADMIT: 2024   : 1935  PROVIDER: Steven Trejo MD    MRN: [id number] LOS: 0 days   AGE/SEX: 88 y.o. female  ROOM: 230/1     Date of Service: 2024                        Chief Complaint:       Cough and congestion with hypoxemia 5 to 6 days with human metapneumovirus  Acute on chronic CHF-suspect diastolic dysfunction  Permanent versus paroxysmal atrial fibrillation-sufficiently rate controlled  Essential hypertension-well regulated      History of Present Illness:       Nir Nichole is a very pleasant and remarkably active 88-year-old WF with history of AF, HPTN and mild hyperlipidemia who presented to [facility] ED earlier today complaining of progressive shortness of air, nonproductive cough and chest congestion x 5 to 6 days without documented fever or shaking chills.  She does admit to some generalized weakness fatigue and dyspnea on exertion and decreased appetite for the last few days.    ED evaluation included routine labs that showed normal CBC and near normal CMP except for serum creatinine 1.17/eGFR 45, troponin greater than 4200 and CXR with mild CHF superimposed on questionable left lower lobe interstitial markings/?  Infiltrate.  She was subsequently treated with IV Lasix and Solu-Medrol before admitting to my service on 2B for further evaluation and treatment.        Review of Systems:         General: Weakness fatigue shortness of air nonproductive cough-see HPI  Skin: No new lesions, though has some chronic extensor surface subcu bruising  HEENT: No head pain, no visual changes no epistaxis, loss of hearing or oropharynx related complaints  Respiratory: Shortness of air with congestion and mild cough, nonproductive-see HPI  Cardiac: Denies palpitations or chest pain no known to have chronic A-fib  Gastrointestinal: No abdominal pain, nausea, vomiting or bowel changes  : No dysuria, hematuria, incontinence or vaginal vault  complaints  Extremities: Adequate ROM without peripheral CC or E  Neuro: Denies focal or lateralizing sensorimotor deficits  Psychiatric: Denies anxiety depression or cognitive decline    Past medical history, surgical history, social history, family history, allergies and all medications reviewed and agreed.      Past History/Allergies?Social History:     Past Medical History:   Diagnosis Date    Atrial fibrillation     Hyperlipidemia     Hypertension     No known drug allergy          Past Surgical History :     Past Surgical History:   Procedure Laterality Date    CARDIAC CATHETERIZATION  10/2014          Family History:     Family History   Problem Relation Age of Onset    Hypertension Other     Stroke Other     Mental illness Other           Social History:     Social History     Tobacco Use    Smoking status: Never    Smokeless tobacco: Never   Substance Use Topics    Alcohol use: No          Allergies:     No Known Allergies      Home meds:     Medications Prior to Admission   Medication Sig Dispense Refill Last Dose    atorvastatin (LIPITOR) 10 MG tablet TAKE 1 TABLET BY MOUTH EVERY NIGHT AT BEDTIME 90 tablet 3     dilTIAZem CD (CARDIZEM CD) 240 MG 24 hr capsule TAKE 1 CAPSULE BY MOUTH EVERY MORNING 90 capsule 3     metoprolol tartrate (LOPRESSOR) 25 MG tablet Take 1 tablet by mouth Daily.       triamterene-hydrochlorothiazide (MAXZIDE-25) 37.5-25 MG per tablet TAKE 1 TABLET BY MOUTH EVERY MORNING 90 tablet 1     warfarin (COUMADIN) 3 MG tablet See Admin Instructions. Take 1.5 tablets by mouth on Monday, Tuesday, Wednesday, Thursday, Friday, and Saturday       warfarin (COUMADIN) 6 MG tablet See Admin Instructions. Take one tablet on Sunday            Scheduled meds:   [START ON 5/24/2024] dilTIAZem CD, 240 mg, Oral, QAM  [START ON 5/24/2024] furosemide, 40 mg, Oral, Daily  ipratropium-albuterol, 3 mL, Nebulization, 4x Daily - RT  metoprolol tartrate, 25 mg, Oral, Daily  predniSONE, 30 mg, Oral, Daily    "Followed by  [START ON 2024] predniSONE, 20 mg, Oral, Daily   Followed by  [START ON 2024] predniSONE, 10 mg, Oral, Daily  sodium chloride, 10 mL, Intravenous, Q12H          Objectives:     tMax 24 hrs: Temp (24hrs), Av.5 °F (36.9 °C), Min:98.4 °F (36.9 °C), Max:98.5 °F (36.9 °C)      Vitals Ranges:   Temp:  [98.4 °F (36.9 °C)-98.5 °F (36.9 °C)] 98.4 °F (36.9 °C)  Heart Rate:  [70-76] 72  Resp:  [16-25] 19  BP: (134-148)/(59-74) 136/60    Intake and Output Last 3 Shifts:   No intake/output data recorded.      Exam:     /60 (BP Location: Right arm, Patient Position: Lying)   Pulse 72   Temp 98.4 °F (36.9 °C) (Oral)   Resp 19   Ht 162.6 cm (64\")   Wt 69.9 kg (154 lb 1.6 oz)   SpO2 93%   BMI 26.45 kg/m²     HEENT: Normocephalic, EOMI, sclera clear, nasopharynx and oropharynx clear with mucosa moist  Neck: Supple without adenopathy; no JVD or bruits  Lungs: Adequate global airflow though minor crackles and rhonchi left base with no wheezes   Heart: Irregularly irregular rhythm consistent with atrial fibrillation with controlled ventricular rate; no M's or G's  Abd: Soft, NT, ND, BS positive, no hepatosplenomegaly  Ext: Adequate ROM without peripheral cyanosis or edema; distal pulses adequate  Neuro: CN grossly intact, no focal deficits        Data Review:       Lab Results (last 24 hours)       Procedure Component Value Units Date/Time    Comprehensive Metabolic Panel [[id number]]  (Abnormal) Collected: 24 1240    Specimen: Blood Updated: 24 1344     Glucose 102 mg/dL      BUN 18 mg/dL      Creatinine 1.17 mg/dL      Sodium 138 mmol/L      Potassium 3.8 mmol/L      Chloride 102 mmol/L      CO2 25.2 mmol/L      Calcium 9.6 mg/dL      Total Protein 7.5 g/dL      Albumin 4.2 g/dL      ALT (SGPT) 13 U/L      AST (SGOT) 29 U/L      Alkaline Phosphatase 70 U/L      Total Bilirubin 0.8 mg/dL      Globulin 3.3 gm/dL      A/G Ratio 1.3 g/dL      BUN/Creatinine Ratio 15.4     Anion Gap 10.8 " mmol/L      eGFR 45.3 mL/min/1.73     Narrative:      GFR Normal >60  Chronic Kidney Disease <60  Kidney Failure <15    The GFR formula is only valid for adults with stable renal function between ages 18 and 70.    BNP [[id number]]  (Abnormal) Collected: 05/23/24 1240    Specimen: Blood Updated: 05/23/24 1344     proBNP 4,278.0 pg/mL     Narrative:      This assay is used as an aid in the diagnosis of individuals suspected of having heart failure. It can be used as an aid in the diagnosis of acute decompensated heart failure (ADHF) in patients presenting with signs and symptoms of ADHF to the emergency department (ED). In addition, NT-proBNP of <300 pg/mL indicates ADHF is not likely.    Age Range Result Interpretation  NT-proBNP Concentration (pg/mL:      <50             Positive            >450                   Gray                 300-450                    Negative             <300    50-75           Positive            >900                  Gray                300-900                  Negative            <300      >75             Positive            >1800                  Gray                300-1800                  Negative            <300    Respiratory Panel PCR w/COVID-19(SARS-CoV-2) ANITHA/ADDISON/AUGUSTINE/PAD/COR/CASSY In-House, NP Swab in [facility], 2 HR TAT - Swab, Nasopharynx [[id number]]  (Abnormal) Collected: 05/23/24 1207    Specimen: Swab from Nasopharynx Updated: 05/23/24 1326     ADENOVIRUS, PCR Not Detected     Coronavirus 229E Not Detected     Coronavirus HKU1 Not Detected     Coronavirus NL63 Not Detected     Coronavirus OC43 Not Detected     COVID19 Not Detected     Human Metapneumovirus Detected     Human Rhinovirus/Enterovirus Not Detected     Influenza A PCR Not Detected     Influenza B PCR Not Detected     Parainfluenza Virus 1 Not Detected     Parainfluenza Virus 2 Not Detected     Parainfluenza Virus 3 Not Detected     Parainfluenza Virus 4 Not Detected     RSV, PCR Not Detected     Bordetella  pertussis pcr Not Detected     Bordetella parapertussis PCR Not Detected     Chlamydophila pneumoniae PCR Not Detected     Mycoplasma pneumo by PCR Not Detected    Narrative:      In the setting of a positive respiratory panel with a viral infection PLUS a negative procalcitonin without other underlying concern for bacterial infection, consider observing off antibiotics or discontinuation of antibiotics and continue supportive care. If the respiratory panel is positive for atypical bacterial infection (Bordetella pertussis, Chlamydophila pneumoniae, or Mycoplasma pneumoniae), consider antibiotic de-escalation to target atypical bacterial infection.    Protime-INR [[id number]]  (Abnormal) Collected: 05/23/24 1240    Specimen: Blood Updated: 05/23/24 1305     Protime 17.9 Seconds      INR 1.71    Urinalysis, Microscopic Only - Urine, Clean Catch [[id number]]  (Abnormal) Collected: 05/23/24 1227    Specimen: Urine, Clean Catch Updated: 05/23/24 1259     RBC, UA 6-10 /HPF      WBC, UA 6-10 /HPF      Bacteria, UA 1+ /HPF      Squamous Epithelial Cells, UA 7-12 /HPF      Hyaline Casts, UA None Seen /LPF      Methodology Automated Microscopy    Urinalysis With Microscopic If Indicated (No Culture) - Urine, Clean Catch [[id number]]  (Abnormal) Collected: 05/23/24 1227    Specimen: Urine, Clean Catch Updated: 05/23/24 1257     Color, UA Yellow     Appearance, UA Cloudy     pH, UA 6.0     Specific Gravity, UA 1.022     Glucose, UA Negative     Ketones, UA Trace     Bilirubin, UA Negative     Blood, UA Trace     Protein, UA 30 mg/dL (1+)     Leuk Esterase, UA Small (1+)     Nitrite, UA Negative     Urobilinogen, UA 1.0 E.U./dL    CBC & Differential [[id number]]  (Normal) Collected: 05/23/24 1240    Specimen: Blood Updated: 05/23/24 1255    Narrative:      The following orders were created for panel order CBC & Differential.  Procedure                               Abnormality         Status                     ---------                                -----------         ------                     CBC Auto Differential[[id number]]        Normal              Final result                 Please view results for these tests on the individual orders.    CBC Auto Differential [[id number]]  (Normal) Collected: 05/23/24 1240    Specimen: Blood Updated: 05/23/24 1255     WBC 5.80 10*3/mm3      RBC 4.40 10*6/mm3      Hemoglobin 12.7 g/dL      Hematocrit 39.0 %      MCV 88.6 fL      MCH 28.9 pg      MCHC 32.6 g/dL      RDW 12.7 %      RDW-SD 41.5 fl      MPV 9.6 fL      Platelets 218 10*3/mm3      Neutrophil % 61.9 %      Lymphocyte % 31.2 %      Monocyte % 5.5 %      Eosinophil % 0.9 %      Basophil % 0.3 %      Immature Grans % 0.2 %      Neutrophils, Absolute 3.59 10*3/mm3      Lymphocytes, Absolute 1.81 10*3/mm3      Monocytes, Absolute 0.32 10*3/mm3      Eosinophils, Absolute 0.05 10*3/mm3      Basophils, Absolute 0.02 10*3/mm3      Immature Grans, Absolute 0.01 10*3/mm3      nRBC 0.0 /100 WBC                   Imaging:      Imaging Results (Last 24 Hours)       Procedure Component Value Units Date/Time    XR Chest 1 View [[id number]] Collected: 05/23/24 1248     Updated: 05/23/24 1251    Narrative:      XR CHEST 1 VW    Date of Exam: 5/23/2024 12:46 PM EDT    Indication: cough    Comparison: 11/28/2023    Findings:  Cardiomediastinal silhouette is enlarged. There is pulmonary vascular congestion with interstitial edema. There is left is airspace disease with small left effusion. No pneumothorax. No acute osseous abnormality identified.      Impression:      Impression:  Mild to moderate CHF features. Superimposed left lower lobe pneumonia not excluded. Correlate with symptoms.      Electronically Signed: Anibal Mota MD    5/23/2024 12:48 PM EDT    Workstation ID: [id number]          No orders to display   C      Assessment:      Principal Problem:    CHF (congestive heart failure)  Active Problems:    Pneumonia due to human metapneumovirus      1.   87-year-old female with 5 to 6-day history of cough and congestion with hypoxemia on admission-most likely due to human metapneumovirus with interstitial edema and mild CHF    2.  Mild CHF based on chest x-ray findings with BNP greater than 4200-suspect diastolic dysfunction with LVH from longstanding hypertensive heart disease and complicated by decreased cardiac output from atrial fibrillation    3.  Chronic atrial fibrillation-sufficiently rate controlled and fully anticoagulated on Coumadin    4.  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  Abnormal UA-will send for C&S    Plan:     Aggressive supportive care with supplemental O2 per nasal cannula, initial dose of IV loop diuretic given along with IV Solu-Medrol.  Will continue long-term maintenance medications for heart rate and blood pressure control.  Close follow-up fluid and electrolyte status.  Continue tapering schedule oral steroid and additional loop diuretic if indicated based on follow-up clinical exam and labs.  Will change long-term anticoagulation from Coumadin to Xarelto for patient convenience and compliance.  Current clinical condition and management plans discussed with patient, patient's daughter at bedside as well as the bedside nurse.  Further recommendations pending patient's clinical progress.          Steven Trejo MD  [facility], [facility]  5/23/2024  18:25 EDT

## 2024-05-24 PROBLEM — I50.33: Status: ACTIVE | Noted: 2024-05-24

## 2024-05-24 LAB
ANION GAP SERPL CALCULATED.3IONS-SCNC: 14.6 MMOL/L (ref 5–15)
BUN SERPL-MCNC: 25 MG/DL (ref 8–23)
BUN/CREAT SERPL: 21.2 (ref 7–25)
CALCIUM SPEC-SCNC: 9.1 MG/DL (ref 8.6–10.5)
CHLORIDE SERPL-SCNC: 98 MMOL/L (ref 98–107)
CO2 SERPL-SCNC: 25.4 MMOL/L (ref 22–29)
CREAT SERPL-MCNC: 1.18 MG/DL (ref 0.57–1)
EGFRCR SERPLBLD CKD-EPI 2021: 44.8 ML/MIN/1.73
GLUCOSE SERPL-MCNC: 157 MG/DL (ref 65–99)
NT-PROBNP SERPL-MCNC: 5670 PG/ML (ref 0–1800)
POTASSIUM SERPL-SCNC: 3.1 MMOL/L (ref 3.5–5.2)
SODIUM SERPL-SCNC: 138 MMOL/L (ref 136–145)

## 2024-05-24 PROCEDURE — 63710000001 PREDNISONE PER 1 MG: Performed by: INTERNAL MEDICINE

## 2024-05-24 PROCEDURE — 94664 DEMO&/EVAL PT USE INHALER: CPT

## 2024-05-24 PROCEDURE — 94799 UNLISTED PULMONARY SVC/PX: CPT

## 2024-05-24 PROCEDURE — 94761 N-INVAS EAR/PLS OXIMETRY MLT: CPT

## 2024-05-24 PROCEDURE — 83880 ASSAY OF NATRIURETIC PEPTIDE: CPT | Performed by: INTERNAL MEDICINE

## 2024-05-24 PROCEDURE — 80048 BASIC METABOLIC PNL TOTAL CA: CPT | Performed by: INTERNAL MEDICINE

## 2024-05-24 PROCEDURE — 63710000001 PREDNISONE PER 5 MG: Performed by: INTERNAL MEDICINE

## 2024-05-24 RX ORDER — POTASSIUM CHLORIDE 20 MEQ/1
40 TABLET, EXTENDED RELEASE ORAL EVERY 4 HOURS
Status: COMPLETED | OUTPATIENT
Start: 2024-05-24 | End: 2024-05-24

## 2024-05-24 RX ADMIN — Medication 10 ML: at 08:33

## 2024-05-24 RX ADMIN — DILTIAZEM HYDROCHLORIDE 240 MG: 240 CAPSULE, EXTENDED RELEASE ORAL at 08:33

## 2024-05-24 RX ADMIN — FUROSEMIDE 40 MG: 40 TABLET ORAL at 08:32

## 2024-05-24 RX ADMIN — Medication 10 ML: at 22:07

## 2024-05-24 RX ADMIN — POTASSIUM CHLORIDE 40 MEQ: 1500 TABLET, EXTENDED RELEASE ORAL at 12:16

## 2024-05-24 RX ADMIN — IPRATROPIUM BROMIDE AND ALBUTEROL SULFATE 3 ML: .5; 3 SOLUTION RESPIRATORY (INHALATION) at 20:03

## 2024-05-24 RX ADMIN — PREDNISONE 30 MG: 20 TABLET ORAL at 08:32

## 2024-05-24 RX ADMIN — POTASSIUM CHLORIDE 40 MEQ: 1500 TABLET, EXTENDED RELEASE ORAL at 17:57

## 2024-05-24 RX ADMIN — IPRATROPIUM BROMIDE AND ALBUTEROL SULFATE 3 ML: .5; 3 SOLUTION RESPIRATORY (INHALATION) at 15:58

## 2024-05-24 RX ADMIN — IPRATROPIUM BROMIDE AND ALBUTEROL SULFATE 3 ML: .5; 3 SOLUTION RESPIRATORY (INHALATION) at 10:49

## 2024-05-24 RX ADMIN — METOPROLOL TARTRATE 25 MG: 25 TABLET, FILM COATED ORAL at 08:33

## 2024-05-24 RX ADMIN — IPRATROPIUM BROMIDE AND ALBUTEROL SULFATE 3 ML: .5; 3 SOLUTION RESPIRATORY (INHALATION) at 07:07

## 2024-05-24 RX ADMIN — POTASSIUM CHLORIDE 40 MEQ: 1500 TABLET, EXTENDED RELEASE ORAL at 08:33

## 2024-05-24 NOTE — CASE MANAGEMENT/SOCIAL WORK
Discharge Planning Assessment   Jossue     Patient Name: Nir Mendez  MRN: [id number]  Today's Date: 5/24/2024    Admit Date: 5/23/2024    Plan: Return home alone. Family to transport. Follow for Oxygen needs   Discharge Needs Assessment       Row Name 05/24/24 1505       Living Environment    People in Home alone    Current Living Arrangements home    Potentially Unsafe Housing Conditions none    In the past 12 months has the electric, gas, oil, or water company threatened to shut off services in your home? No    Primary Care Provided by self    Provides Primary Care For no one    Quality of Family Relationships helpful;involved;supportive    Able to Return to Prior Arrangements yes       Resource/Environmental Concerns    Resource/Environmental Concerns none    Transportation Concerns none       Transportation Needs    In the past 12 months, has lack of transportation kept you from medical appointments or from getting medications? no    In the past 12 months, has lack of transportation kept you from meetings, work, or from getting things needed for daily living? No       Food Insecurity    Within the past 12 months, you worried that your food would run out before you got the money to buy more. Never true    Within the past 12 months, the food you bought just didn't last and you didn't have money to get more. Never true       Transition Planning    Patient/Family Anticipates Transition to home    Patient/Family Anticipated Services at Transition none    Transportation Anticipated car, drives self;family or friend will provide       Discharge Needs Assessment    Readmission Within the Last 30 Days no previous admission in last 30 days    Equipment Currently Used at Home none    Concerns to be Addressed discharge planning    Anticipated Changes Related to Illness none    Equipment Needed After Discharge none                   Discharge Plan       Row Name 05/24/24 7419       Plan    Plan Return home alone. Family  to transport. Follow for Oxygen needs    Patient/Family in Agreement with Plan yes    Plan Comments Barriers:Oxygen 1 liter with no home oxygen. Isolation for Positive Human Metapneumovirus. Pending Echo. CM met with Mrs. Mendez with daughter and grandson at bedside. She resides alone,drives and does not use any equipment. PCP and Pharmacy verified. Denies any financial concerns. CM will follow as needed                       Demographic Summary       Row Name 05/24/24 1509       General Information    Admission Type inpatient    Arrived From emergency department    Required Notices Provided Important Message from Medicare    Referral Source admission list    Reason for Consult discharge planning    Preferred Language English                   Functional Status       Row Name 05/24/24 1500       Functional Status    Usual Activity Tolerance good    Current Activity Tolerance moderate       Functional Status, IADL    Medications independent    Meal Preparation independent    Housekeeping independent    Laundry independent    Shopping independent       Mental Status    General Appearance WDL WDL       Mental Status Summary    Recent Changes in Mental Status/Cognitive Functioning no changes       Employment/    Employment Status retired                             Farida TYLER,RN Case Manager  [facility]  Phone: Desk- [phone] cell- [phone]

## 2024-05-24 NOTE — PLAN OF CARE
Problem: Adult Inpatient Plan of Care  Goal: Absence of Hospital-Acquired Illness or Injury  Intervention: Identify and Manage Fall Risk  Recent Flowsheet Documentation  Taken 5/24/2024 0415 by Maria Ines Cheung RN  Safety Promotion/Fall Prevention: safety round/check completed  Taken 5/24/2024 0215 by Maria Ines Cheung RN  Safety Promotion/Fall Prevention: safety round/check completed  Taken 5/24/2024 0030 by Maria Ines Cheung RN  Safety Promotion/Fall Prevention: safety round/check completed  Taken 5/23/2024 2215 by Maria Ines Cheung RN  Safety Promotion/Fall Prevention: safety round/check completed  Taken 5/23/2024 2030 by Maria Ines Cheung RN  Safety Promotion/Fall Prevention:   safety round/check completed   room organization consistent   clutter free environment maintained   assistive device/personal items within reach  Intervention: Prevent Skin Injury  Recent Flowsheet Documentation  Taken 5/23/2024 2030 by Maria Ines Cheung RN  Body Position: position changed independently  Intervention: Prevent and Manage VTE (Venous Thromboembolism) Risk  Recent Flowsheet Documentation  Taken 5/23/2024 2030 by Maria Ines Cheung RN  Activity Management: ambulated in room  Range of Motion: active ROM (range of motion) encouraged  Intervention: Prevent Infection  Recent Flowsheet Documentation  Taken 5/23/2024 2030 by Maria Ines Cheung RN  Infection Prevention:   visitors restricted/screened   single patient room provided   hand hygiene promoted  Goal: Optimal Comfort and Wellbeing  Intervention: Provide Person-Centered Care  Recent Flowsheet Documentation  Taken 5/23/2024 2030 by Maria Ines Cheung RN  Trust Relationship/Rapport:   care explained   choices provided   emotional support provided   empathic listening provided   questions answered   questions encouraged   reassurance provided   thoughts/feelings acknowledged     Problem: Hypertension Comorbidity  Goal: Blood Pressure in Desired Range  Intervention: Maintain Blood Pressure Management  Recent  Flowsheet Documentation  Taken 5/23/2024 2030 by Maria Ines Cheung RN  Medication Review/Management: medications reviewed   Goal Outcome Evaluation:

## 2024-05-24 NOTE — PROGRESS NOTES
PROGRESS NOTE      Name: Nir Mendez ADMIT: 2024   : 1935  PROVIDER: Steven Trejo MD    MRN: [id number] LOS: 0 days   AGE/SEX: 88 y.o. female  ROOM: 230/1     Date of Service: 2024                           CHIEF COMPLIANTS / REASON FOR FOLLOW UP        Cough and congestion with hypoxemia 5 to 6 days with human metapneumovirus  Acute on chronic CHF-suspect diastolic dysfunction  Permanent versus paroxysmal atrial fibrillation-sufficiently rate controlled  Essential hypertension-well regulated    Subjective:      Awake alert, sitting up in bed on nasal cannula with daughter and grandson at bedside.  Complaining of mild generalized weakness and slight frontal forehead headache.  Tolerating diet.  Has been up with assistance.  Claims she is breathing much easier today though still not back to baseline.  Claims to have a good diuretic response.       Review of Systems:       10 point review of systems completed-essentially unchanged    General: Weakness fatigue shortness of air nonproductive cough-see HPI  Skin: No new lesions, though has some chronic extensor surface subcu bruising  HEENT: No head pain, no visual changes no epistaxis, loss of hearing or oropharynx related complaints  Respiratory: Shortness of air with congestion and mild cough, nonproductive-see HPI  Cardiac: Denies palpitations or chest pain no known to have chronic A-fib  Gastrointestinal: No abdominal pain, nausea, vomiting or bowel changes  : No dysuria, hematuria, incontinence or vaginal vault complaints  Extremities: Adequate ROM without peripheral CC or E  Neuro: Denies focal or lateralizing sensorimotor deficits  Psychiatric: Denies anxiety depression or cognitive decline         ASSESSMENT:                                                                            Principal Problem:    CHF (congestive heart failure)  Active Problems:    Pneumonia due to human metapneumovirus    Congestive heart failure with  "left ventricular diastolic dysfunction, acute on chronic       1.  87-year-old female with 5 to 6-day history of cough and congestion with hypoxemia on admission-most likely due to human metapneumovirus with interstitial edema and mild CHF     2.  Mild CHF based on chest x-ray findings with BNP greater than 4200-suspect diastolic dysfunction with LVH from longstanding hypertensive heart disease and complicated by decreased cardiac output from atrial fibrillation     3.  Chronic atrial fibrillation-sufficiently rate controlled and fully anticoagulated on Coumadin     4.  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  Abnormal UA-will send for C&S    6.  Hypokalemia, mild-likely secondary to loop diuretics      PLAN:                                                                            Aggressive supportive care with supplemental O2 per nasal cannula, initial dose of IV loop diuretic given along with IV Solu-Medrol.  Will continue long-term maintenance medications for heart rate and blood pressure control.  Close follow-up fluid and electrolyte status.  Continue tapering schedule oral steroid and additional loop diuretic if indicated based on follow-up clinical exam and labs.  Will change long-term anticoagulation from Coumadin to Xarelto for patient convenience and compliance.  Current clinical condition and management plans discussed with patient, patient's daughter at bedside as well as the bedside nurse.  Further recommendations pending patient's clinical progress.          OBJECTIVE                                                                        Exam:  /53 (BP Location: Right arm, Patient Position: Lying)   Pulse 68   Temp 97.8 °F (36.6 °C) (Oral)   Resp 21   Ht 162.6 cm (64\")   Wt 69.7 kg (153 lb 10.6 oz)   SpO2 99%   BMI 26.38 kg/m²   Intake/Output last 3 shifts:  I/O last 3 completed shifts:  In: 240 [P.O.:240]  Out: 550 [Urine:550]  Intake/Output this shift:  No intake/output data " recorded.    General Appearance:  Alert, cooperative, no distress, appears stated age  HEENT: NC, EOMI, sclera clear, nose and oropharynx clear with mucosa moist  Neck:  Supple, no adenopathy;      Lungs:   Essentially clear to auscultation bilaterally with adequate global airflow; no adventitious sounds heard today-improved  Heart: RRR, normal S1-S2 no M's or G's  Abdomen:   Soft, nontender, no masses, no hepatomegaly, no splenomegaly  Extremities: Lower legs puffy but nonpitting with chronic brawny changes; adequate ROM and good distal pulses  Neurologic:   Alert and oriented, no focal deficits    Scheduled Meds:dilTIAZem CD, 240 mg, Oral, QAM  furosemide, 40 mg, Oral, Daily  ipratropium-albuterol, 3 mL, Nebulization, 4x Daily - RT  metoprolol tartrate, 25 mg, Oral, Daily  potassium chloride ER, 40 mEq, Oral, Q4H  [START ON 5/25/2024] predniSONE, 20 mg, Oral, Daily   Followed by  [START ON 5/27/2024] predniSONE, 10 mg, Oral, Daily  sodium chloride, 10 mL, Intravenous, Q12H      Continuous Infusions:   PRN Meds:  acetaminophen    senna-docusate sodium **AND** polyethylene glycol **AND** bisacodyl **AND** bisacodyl    Calcium Replacement - Follow Nurse / BPA Driven Protocol    Magnesium Standard Dose Replacement - Follow Nurse / BPA Driven Protocol    Phosphorus Replacement - Follow Nurse / BPA Driven Protocol    Potassium Replacement - Follow Nurse / BPA Driven Protocol    [COMPLETED] Insert Peripheral IV **AND** sodium chloride    sodium chloride    sodium chloride         Data Review:                                                                              Lab Results (last 24 hours)       Procedure Component Value Units Date/Time    Basic Metabolic Panel [[id number]]  (Abnormal) Collected: 05/24/24 0458    Specimen: Blood from Arm, Right Updated: 05/24/24 0624     Glucose 157 mg/dL      BUN 25 mg/dL      Creatinine 1.18 mg/dL      Sodium 138 mmol/L      Potassium 3.1 mmol/L      Chloride 98 mmol/L      CO2  25.4 mmol/L      Calcium 9.1 mg/dL      BUN/Creatinine Ratio 21.2     Anion Gap 14.6 mmol/L      eGFR 44.8 mL/min/1.73     Narrative:      GFR Normal >60  Chronic Kidney Disease <60  Kidney Failure <15    The GFR formula is only valid for adults with stable renal function between ages 18 and 70.    BNP [[id number]]  (Abnormal) Collected: 05/24/24 0458    Specimen: Blood from Arm, Right Updated: 05/24/24 0624     proBNP 5,670.0 pg/mL     Narrative:      This assay is used as an aid in the diagnosis of individuals suspected of having heart failure. It can be used as an aid in the diagnosis of acute decompensated heart failure (ADHF) in patients presenting with signs and symptoms of ADHF to the emergency department (ED). In addition, NT-proBNP of <300 pg/mL indicates ADHF is not likely.    Age Range Result Interpretation  NT-proBNP Concentration (pg/mL:      <50             Positive            >450                   Gray                 300-450                    Negative             <300    50-75           Positive            >900                  Gray                300-900                  Negative            <300      >75             Positive            >1800                  Gray                300-1800                  Negative            <300                 Imaging:                                                                             Imaging Results (Last 24 Hours)       ** No results found for the last 24 hours. **          Telemetry Scan   Final Result      Telemetry Scan   Final Result               Steven Trejo MD  [facility]  5/24/2024  14:34 EDT

## 2024-05-25 ENCOUNTER — APPOINTMENT (OUTPATIENT)
Dept: CARDIOLOGY | Facility: HOSPITAL | Age: 89
DRG: 193 | End: 2024-05-25
Payer: MEDICARE

## 2024-05-25 LAB
ANION GAP SERPL CALCULATED.3IONS-SCNC: 15 MMOL/L (ref 5–15)
BH CV ECHO MEAS - ACS: 1.6 CM
BH CV ECHO MEAS - AI P1/2T: 595.3 MSEC
BH CV ECHO MEAS - AO MAX PG: 5.2 MMHG
BH CV ECHO MEAS - AO MEAN PG: 3 MMHG
BH CV ECHO MEAS - AO V2 MAX: 114 CM/SEC
BH CV ECHO MEAS - AO V2 VTI: 25.3 CM
BH CV ECHO MEAS - AVA(I,D): 2.08 CM2
BH CV ECHO MEAS - EDV(CUBED): 97.3 ML
BH CV ECHO MEAS - EDV(MOD-SP4): 78.5 ML
BH CV ECHO MEAS - EF(MOD-BP): 56 %
BH CV ECHO MEAS - EF(MOD-SP4): 56.1 %
BH CV ECHO MEAS - ESV(CUBED): 27 ML
BH CV ECHO MEAS - ESV(MOD-SP4): 34.5 ML
BH CV ECHO MEAS - FS: 34.8 %
BH CV ECHO MEAS - IVS/LVPW: 1 CM
BH CV ECHO MEAS - IVSD: 1 CM
BH CV ECHO MEAS - LA DIMENSION: 4.7 CM
BH CV ECHO MEAS - LAT PEAK E' VEL: 11 CM/SEC
BH CV ECHO MEAS - LV DIASTOLIC VOL/BSA (35-75): 45 CM2
BH CV ECHO MEAS - LV MASS(C)D: 158.8 GRAMS
BH CV ECHO MEAS - LV MAX PG: 3.2 MMHG
BH CV ECHO MEAS - LV MEAN PG: 2 MMHG
BH CV ECHO MEAS - LV SYSTOLIC VOL/BSA (12-30): 19.8 CM2
BH CV ECHO MEAS - LV V1 MAX: 90.1 CM/SEC
BH CV ECHO MEAS - LV V1 VTI: 18.6 CM
BH CV ECHO MEAS - LVIDD: 4.6 CM
BH CV ECHO MEAS - LVIDS: 3 CM
BH CV ECHO MEAS - LVOT AREA: 2.8 CM2
BH CV ECHO MEAS - LVOT DIAM: 1.9 CM
BH CV ECHO MEAS - LVPWD: 1 CM
BH CV ECHO MEAS - MED PEAK E' VEL: 9.3 CM/SEC
BH CV ECHO MEAS - MV A MAX VEL: 21 CM/SEC
BH CV ECHO MEAS - MV DEC SLOPE: 245 CM/SEC2
BH CV ECHO MEAS - MV DEC TIME: 0.08 SEC
BH CV ECHO MEAS - MV E MAX VEL: 94.9 CM/SEC
BH CV ECHO MEAS - MV E/A: 4.5
BH CV ECHO MEAS - MV MAX PG: 5.3 MMHG
BH CV ECHO MEAS - MV MEAN PG: 2 MMHG
BH CV ECHO MEAS - MV P1/2T: 139.9 MSEC
BH CV ECHO MEAS - MV V2 VTI: 30.2 CM
BH CV ECHO MEAS - MVA(P1/2T): 1.57 CM2
BH CV ECHO MEAS - MVA(VTI): 1.75 CM2
BH CV ECHO MEAS - PA ACC TIME: 0.11 SEC
BH CV ECHO MEAS - PA V2 MAX: 87 CM/SEC
BH CV ECHO MEAS - RAP SYSTOLE: 3 MMHG
BH CV ECHO MEAS - RV MAX PG: 0.86 MMHG
BH CV ECHO MEAS - RV V1 MAX: 46.3 CM/SEC
BH CV ECHO MEAS - RV V1 VTI: 11.7 CM
BH CV ECHO MEAS - RVDD: 4 CM
BH CV ECHO MEAS - RVSP: 41.9 MMHG
BH CV ECHO MEAS - SV(LVOT): 52.7 ML
BH CV ECHO MEAS - SV(MOD-SP4): 44 ML
BH CV ECHO MEAS - SVI(LVOT): 30.2 ML/M2
BH CV ECHO MEAS - SVI(MOD-SP4): 25.2 ML/M2
BH CV ECHO MEAS - TAPSE (>1.6): 1.36 CM
BH CV ECHO MEAS - TR MAX PG: 38.9 MMHG
BH CV ECHO MEAS - TR MAX VEL: 312 CM/SEC
BH CV ECHO MEASUREMENTS AVERAGE E/E' RATIO: 9.35
BH CV XLRA - TDI S': 8.8 CM/SEC
BUN SERPL-MCNC: 30 MG/DL (ref 8–23)
BUN/CREAT SERPL: 25.2 (ref 7–25)
CALCIUM SPEC-SCNC: 8.8 MG/DL (ref 8.6–10.5)
CHLORIDE SERPL-SCNC: 103 MMOL/L (ref 98–107)
CO2 SERPL-SCNC: 23 MMOL/L (ref 22–29)
CREAT SERPL-MCNC: 1.19 MG/DL (ref 0.57–1)
EGFRCR SERPLBLD CKD-EPI 2021: 44.3 ML/MIN/1.73
GLUCOSE SERPL-MCNC: 127 MG/DL (ref 65–99)
LEFT ATRIUM VOLUME INDEX: 43.9 ML/M2
POTASSIUM SERPL-SCNC: 4.2 MMOL/L (ref 3.5–5.2)
SINUS: 3 CM
SODIUM SERPL-SCNC: 141 MMOL/L (ref 136–145)
STJ: 2.4 CM

## 2024-05-25 PROCEDURE — 94799 UNLISTED PULMONARY SVC/PX: CPT

## 2024-05-25 PROCEDURE — 94664 DEMO&/EVAL PT USE INHALER: CPT

## 2024-05-25 PROCEDURE — 63710000001 PREDNISONE PER 1 MG: Performed by: INTERNAL MEDICINE

## 2024-05-25 PROCEDURE — 94761 N-INVAS EAR/PLS OXIMETRY MLT: CPT

## 2024-05-25 PROCEDURE — 80048 BASIC METABOLIC PNL TOTAL CA: CPT | Performed by: INTERNAL MEDICINE

## 2024-05-25 PROCEDURE — 93306 TTE W/DOPPLER COMPLETE: CPT | Performed by: STUDENT IN AN ORGANIZED HEALTH CARE EDUCATION/TRAINING PROGRAM

## 2024-05-25 PROCEDURE — 93306 TTE W/DOPPLER COMPLETE: CPT

## 2024-05-25 RX ADMIN — SENNOSIDES AND DOCUSATE SODIUM 2 TABLET: 50; 8.6 TABLET ORAL at 09:25

## 2024-05-25 RX ADMIN — ACETAMINOPHEN 650 MG: 325 TABLET, FILM COATED ORAL at 09:25

## 2024-05-25 RX ADMIN — METOPROLOL TARTRATE 25 MG: 25 TABLET, FILM COATED ORAL at 09:26

## 2024-05-25 RX ADMIN — Medication 10 ML: at 09:39

## 2024-05-25 RX ADMIN — IPRATROPIUM BROMIDE AND ALBUTEROL SULFATE 3 ML: .5; 3 SOLUTION RESPIRATORY (INHALATION) at 20:58

## 2024-05-25 RX ADMIN — Medication 10 ML: at 21:30

## 2024-05-25 RX ADMIN — RIVAROXABAN 15 MG: 15 TABLET, FILM COATED ORAL at 17:59

## 2024-05-25 RX ADMIN — DILTIAZEM HYDROCHLORIDE 240 MG: 240 CAPSULE, EXTENDED RELEASE ORAL at 09:25

## 2024-05-25 RX ADMIN — FUROSEMIDE 40 MG: 40 TABLET ORAL at 09:38

## 2024-05-25 RX ADMIN — IPRATROPIUM BROMIDE AND ALBUTEROL SULFATE 3 ML: .5; 3 SOLUTION RESPIRATORY (INHALATION) at 08:40

## 2024-05-25 RX ADMIN — PREDNISONE 20 MG: 20 TABLET ORAL at 09:25

## 2024-05-25 RX ADMIN — RIVAROXABAN 15 MG: 15 TABLET, FILM COATED ORAL at 02:13

## 2024-05-25 RX ADMIN — IPRATROPIUM BROMIDE AND ALBUTEROL SULFATE 3 ML: .5; 3 SOLUTION RESPIRATORY (INHALATION) at 14:51

## 2024-05-25 NOTE — PROGRESS NOTES
PROGRESS NOTE      Name: Nir Mendez ADMIT: 2024   : 1935  PROVIDER: Steven Trejo MD    MRN: [id number] LOS: 1 days   AGE/SEX: 88 y.o. female  ROOM: 230/1     Date of Service: 2024                           CHIEF COMPLIANTS / REASON FOR FOLLOW UP        Cough and congestion with hypoxemia 5 to 6 days with human metapneumovirus  Acute on chronic CHF-suspect diastolic dysfunction  paroxysmal atrial fibrillation-sufficiently rate controlled  Essential hypertension-well regulated    Subjective:      Still complains of generally not feeling well with vague complaints right-sided chest discomfort and frontal headache without visual changes, shortness of air, palpitations or GI complaints.  Sitting up in chair with multiple family members present throughout exam.  Breathing comfortably on room air with satisfactory SaO2       Review of Systems:       10 point ROS essentially unchanged    General: Weakness fatigue shortness of air nonproductive cough-see HPI  Skin: No new lesions, though has some chronic extensor surface subcu bruising  HEENT: No head pain, no visual changes no epistaxis, loss of hearing or oropharynx related complaints  Respiratory: Shortness of air with congestion and mild cough, nonproductive-see HPI  Cardiac: Denies palpitations or chest pain no known to have chronic A-fib  Gastrointestinal: No abdominal pain, nausea, vomiting or bowel changes  : No dysuria, hematuria, incontinence or vaginal vault complaints  Extremities: Adequate ROM without peripheral CC or E  Neuro: Denies focal or lateralizing sensorimotor deficits; complains of mild intermittent transient episodes of frontal headache without visual changes  Psychiatric: Denies anxiety depression or cognitive decline         ASSESSMENT:                                                                            Principal Problem:    CHF (congestive heart failure)  Active Problems:    Pneumonia due to human  "metapneumovirus    Congestive heart failure with left ventricular diastolic dysfunction, acute on chronic       1.  87-year-old female with 5 to 6-day history of cough and congestion with hypoxemia on admission-most likely due to human metapneumovirus with interstitial edema and mild CHF     2.  Mild CHF based on chest x-ray findings with BNP greater than 4200-suspect diastolic dysfunction with LVH from longstanding hypertensive heart disease and complicated by decreased cardiac output from atrial fibrillation     3.  Chronic atrial fibrillation-sufficiently rate controlled and fully anticoagulated on Coumadin     4.  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  Abnormal UA-will send for C&S     6.  Hypokalemia, mild-likely secondary to loop diuretics      PLAN:                                                                            Aggressive supportive care with supplemental O2 per nasal cannula, initial dose of IV loop diuretic given along with IV Solu-Medrol.  Will continue long-term maintenance medications for heart rate and blood pressure control.  Close follow-up fluid and electrolyte status.  Continue tapering schedule oral steroid and additional loop diuretic if indicated based on follow-up clinical exam and labs.  Will change long-term anticoagulation from Coumadin to Xarelto for patient convenience and compliance.  Current clinical condition and management plans discussed with patient, patient's daughter at bedside as well as the bedside nurse.  Further recommendations pending patient's clinical progress.  Consider head CT if ongoing complaints of frontal headache  Anticipate discharge to home tomorrow       OBJECTIVE                                                                        Exam:  /67   Pulse 79   Temp 97.4 °F (36.3 °C) (Oral)   Resp 18   Ht 162.6 cm (64\")   Wt 69.7 kg (153 lb 10.6 oz)   SpO2 98%   BMI 26.38 kg/m²   Intake/Output last 3 shifts:  I/O last 3 completed " shifts:  In: 680 [P.O.:680]  Out: 550 [Urine:550]  Intake/Output this shift:  No intake/output data recorded.    General Appearance:  Alert, cooperative, no distress, appears stated age  HEENT: NC, EOMI, sclera clear, nose and oropharynx clear with mucosa moist  Neck:  Supple, no adenopathy; no JVD or bruits  Lungs:   Adequate global airflow with only very minor scattered rhonchi more left base than right  Heart: Irregularly irregular rhythm consistent with atrial fibrillation with controlled ventricular rate  Abdomen:   Soft, nontender, no masses, no hepatomegaly, no splenomegaly  Extremities: Adequate ROM without peripheral cyanosis or edema; distal pulses adequate, symmetric  Neurologic:   Alert and oriented, no focal deficits    Scheduled Meds:dilTIAZem CD, 240 mg, Oral, QAM  furosemide, 40 mg, Oral, Daily  ipratropium-albuterol, 3 mL, Nebulization, 4x Daily - RT  metoprolol tartrate, 25 mg, Oral, Daily  predniSONE, 20 mg, Oral, Daily   Followed by  [START ON 5/27/2024] predniSONE, 10 mg, Oral, Daily  rivaroxaban, 15 mg, Oral, Daily With Dinner  sodium chloride, 10 mL, Intravenous, Q12H      Continuous Infusions:   PRN Meds:  acetaminophen    senna-docusate sodium **AND** polyethylene glycol **AND** bisacodyl **AND** bisacodyl    Calcium Replacement - Follow Nurse / BPA Driven Protocol    Magnesium Standard Dose Replacement - Follow Nurse / BPA Driven Protocol    Phosphorus Replacement - Follow Nurse / BPA Driven Protocol    Potassium Replacement - Follow Nurse / BPA Driven Protocol    [COMPLETED] Insert Peripheral IV **AND** sodium chloride    sodium chloride    sodium chloride         Data Review:                                                                              Lab Results (last 24 hours)       Procedure Component Value Units Date/Time    Basic Metabolic Panel [[id number]]  (Abnormal) Collected: 05/25/24 0521    Specimen: Blood from Arm, Right Updated: 05/25/24 0701     Glucose 127 mg/dL      BUN 30  mg/dL      Creatinine 1.19 mg/dL      Sodium 141 mmol/L      Potassium 4.2 mmol/L      Comment: Result checked          Chloride 103 mmol/L      CO2 23.0 mmol/L      Calcium 8.8 mg/dL      BUN/Creatinine Ratio 25.2     Anion Gap 15.0 mmol/L      eGFR 44.3 mL/min/1.73     Narrative:      GFR Normal >60  Chronic Kidney Disease <60  Kidney Failure <15    The GFR formula is only valid for adults with stable renal function between ages 18 and 70.                 Imaging:                                                                             Imaging Results (Last 24 Hours)       ** No results found for the last 24 hours. **          Telemetry Scan   Final Result      Telemetry Scan   Final Result      Telemetry Scan   Final Result      Telemetry Scan   Final Result      Telemetry Scan   Final Result      Telemetry Scan   Final Result               Steven Trejo MD  [facility]  5/25/2024  10:29 EDT

## 2024-05-25 NOTE — PLAN OF CARE
Goal Outcome Evaluation:     Problem: Adult Inpatient Plan of Care  Goal: Plan of Care Review  Outcome: Ongoing, Progressing  Goal: Patient-Specific Goal (Individualized)  Outcome: Ongoing, Progressing  Goal: Absence of Hospital-Acquired Illness or Injury  Outcome: Ongoing, Progressing  Intervention: Identify and Manage Fall Risk  Recent Flowsheet Documentation  Taken 5/24/2024 1800 by Ericka Thomas RN  Safety Promotion/Fall Prevention:   safety round/check completed   room organization consistent   clutter free environment maintained   assistive device/personal items within reach  Taken 5/24/2024 1600 by Ericka Thomas RN  Safety Promotion/Fall Prevention:   safety round/check completed   room organization consistent   assistive device/personal items within reach   clutter free environment maintained  Taken 5/24/2024 1400 by Ericka Thomas RN  Safety Promotion/Fall Prevention:   safety round/check completed   room organization consistent   assistive device/personal items within reach  Taken 5/24/2024 1200 by Ericka Thomas RN  Safety Promotion/Fall Prevention:   safety round/check completed   room organization consistent   clutter free environment maintained   assistive device/personal items within reach  Taken 5/24/2024 1000 by Ericka Thomas RN  Safety Promotion/Fall Prevention:   safety round/check completed   room organization consistent   clutter free environment maintained  Taken 5/24/2024 0830 by Ericka Thomas RN  Safety Promotion/Fall Prevention:   room organization consistent   safety round/check completed   clutter free environment maintained   assistive device/personal items within reach   activity supervised  Intervention: Prevent Skin Injury  Recent Flowsheet Documentation  Taken 5/24/2024 1600 by Ericka Thomas RN  Body Position: position changed independently  Taken 5/24/2024 1200 by Ericka Thomas RN  Body Position: position changed independently  Taken 5/24/2024 0830 by Ericka Thomas RN  Body Position:  position changed independently  Intervention: Prevent and Manage VTE (Venous Thromboembolism) Risk  Recent Flowsheet Documentation  Taken 5/24/2024 1900 by Ericka Thomas RN  Activity Management: ambulated outside room  Intervention: Prevent Infection  Recent Flowsheet Documentation  Taken 5/24/2024 1800 by Ericka Thomas RN  Infection Prevention:   hand hygiene promoted   personal protective equipment utilized   rest/sleep promoted  Taken 5/24/2024 1600 by Ericka Thomas RN  Infection Prevention:   hand hygiene promoted   personal protective equipment utilized   rest/sleep promoted  Taken 5/24/2024 1400 by Ericka Thomas RN  Infection Prevention:   hand hygiene promoted   personal protective equipment utilized   rest/sleep promoted  Taken 5/24/2024 1200 by Ericka Thomas RN  Infection Prevention:   hand hygiene promoted   personal protective equipment utilized   rest/sleep promoted  Taken 5/24/2024 1000 by Ericka Thomas RN  Infection Prevention:   hand hygiene promoted   personal protective equipment utilized   rest/sleep promoted  Taken 5/24/2024 0830 by Ericka Thomas RN  Infection Prevention:   hand hygiene promoted   personal protective equipment utilized   rest/sleep promoted  Goal: Optimal Comfort and Wellbeing  Outcome: Ongoing, Progressing  Intervention: Provide Person-Centered Care  Recent Flowsheet Documentation  Taken 5/24/2024 0830 by Ericka Thomas RN  Trust Relationship/Rapport:   care explained   questions answered   thoughts/feelings acknowledged  Goal: Readiness for Transition of Care  Outcome: Ongoing, Progressing     Problem: Hypertension Comorbidity  Goal: Blood Pressure in Desired Range  Outcome: Ongoing, Progressing  Intervention: Maintain Blood Pressure Management  Recent Flowsheet Documentation  Taken 5/24/2024 0830 by Ericka Thomas RN  Medication Review/Management: medications reviewed

## 2024-05-26 ENCOUNTER — READMISSION MANAGEMENT (OUTPATIENT)
Dept: CALL CENTER | Facility: HOSPITAL | Age: 89
End: 2024-05-26
Payer: COMMERCIAL

## 2024-05-26 VITALS
BODY MASS INDEX: 26.12 KG/M2 | RESPIRATION RATE: 16 BRPM | WEIGHT: 153 LBS | SYSTOLIC BLOOD PRESSURE: 135 MMHG | DIASTOLIC BLOOD PRESSURE: 59 MMHG | HEART RATE: 64 BPM | HEIGHT: 64 IN | TEMPERATURE: 98.1 F | OXYGEN SATURATION: 100 %

## 2024-05-26 PROBLEM — I50.33 ACUTE ON CHRONIC HEART FAILURE WITH PRESERVED EJECTION FRACTION (HFPEF): Status: ACTIVE | Noted: 2024-05-26

## 2024-05-26 LAB
ANION GAP SERPL CALCULATED.3IONS-SCNC: 12.2 MMOL/L (ref 5–15)
BUN SERPL-MCNC: 30 MG/DL (ref 8–23)
BUN/CREAT SERPL: 27.5 (ref 7–25)
CALCIUM SPEC-SCNC: 9 MG/DL (ref 8.6–10.5)
CHLORIDE SERPL-SCNC: 104 MMOL/L (ref 98–107)
CO2 SERPL-SCNC: 23.8 MMOL/L (ref 22–29)
CREAT SERPL-MCNC: 1.09 MG/DL (ref 0.57–1)
EGFRCR SERPLBLD CKD-EPI 2021: 49.3 ML/MIN/1.73
GLUCOSE SERPL-MCNC: 112 MG/DL (ref 65–99)
POTASSIUM SERPL-SCNC: 3.7 MMOL/L (ref 3.5–5.2)
SODIUM SERPL-SCNC: 140 MMOL/L (ref 136–145)

## 2024-05-26 PROCEDURE — 63710000001 PREDNISONE PER 1 MG: Performed by: INTERNAL MEDICINE

## 2024-05-26 PROCEDURE — 94664 DEMO&/EVAL PT USE INHALER: CPT

## 2024-05-26 PROCEDURE — 80048 BASIC METABOLIC PNL TOTAL CA: CPT | Performed by: INTERNAL MEDICINE

## 2024-05-26 PROCEDURE — 94799 UNLISTED PULMONARY SVC/PX: CPT

## 2024-05-26 PROCEDURE — 94761 N-INVAS EAR/PLS OXIMETRY MLT: CPT

## 2024-05-26 RX ORDER — ACETAMINOPHEN 325 MG/1
650 TABLET ORAL EVERY 4 HOURS PRN
Qty: 90 TABLET | Refills: 0 | Status: SHIPPED | OUTPATIENT
Start: 2024-05-26 | End: 2024-06-25

## 2024-05-26 RX ORDER — MONTELUKAST SODIUM 10 MG/1
10 TABLET ORAL NIGHTLY
Qty: 30 TABLET | Refills: 0 | Status: SHIPPED | OUTPATIENT
Start: 2024-05-26 | End: 2024-06-25

## 2024-05-26 RX ORDER — DILTIAZEM HYDROCHLORIDE 240 MG/1
240 CAPSULE, COATED, EXTENDED RELEASE ORAL DAILY
Qty: 30 CAPSULE | Refills: 0 | Status: SHIPPED | OUTPATIENT
Start: 2024-05-26 | End: 2024-06-25

## 2024-05-26 RX ORDER — PREDNISONE 10 MG/1
10 TABLET ORAL DAILY
Qty: 2 TABLET | Refills: 0 | Status: SHIPPED | OUTPATIENT
Start: 2024-05-27 | End: 2024-05-29

## 2024-05-26 RX ADMIN — METOPROLOL TARTRATE 25 MG: 25 TABLET, FILM COATED ORAL at 08:42

## 2024-05-26 RX ADMIN — IPRATROPIUM BROMIDE AND ALBUTEROL SULFATE 3 ML: .5; 3 SOLUTION RESPIRATORY (INHALATION) at 08:46

## 2024-05-26 RX ADMIN — DILTIAZEM HYDROCHLORIDE 240 MG: 240 CAPSULE, EXTENDED RELEASE ORAL at 08:42

## 2024-05-26 RX ADMIN — FUROSEMIDE 40 MG: 40 TABLET ORAL at 08:42

## 2024-05-26 RX ADMIN — PREDNISONE 20 MG: 20 TABLET ORAL at 08:42

## 2024-05-26 RX ADMIN — Medication 10 ML: at 08:45

## 2024-05-26 RX ADMIN — IPRATROPIUM BROMIDE AND ALBUTEROL SULFATE 3 ML: .5; 3 SOLUTION RESPIRATORY (INHALATION) at 11:38

## 2024-05-26 NOTE — DISCHARGE SUMMARY
Discharge Summary      Name: Nir Mendez ADMIT: 2024   : 1935  PROVIDER: Steven Trejo MD    MRN: [id number] LOS: 2 days   AGE/SEX: 88 y.o. female  ROOM: 230/1     Date of Service: 2024                        Date of Discharge:       2024    Discharge Diagnosis:      1.  87-year-old female with 5 to 6-day history of cough and congestion with hypoxemia on admission-most likely due to human metapneumovirus with interstitial edema and mild CHF     2.  Mild CHF based on chest x-ray findings with BNP greater than 4200-suspect diastolic dysfunction with LVH from longstanding hypertensive heart disease and complicated by decreased cardiac output from atrial fibrillation  -Echo assessment shows normal LV systolic function and modest increase in RV systolic pressure though diastolic dysfunction could not be accurately assessed due to A-fib     3.  Chronic atrial fibrillation-sufficiently rate controlled and fully anticoagulated-now on Xarelto    4.  Mild CKD-creatinine 1.17 with EGFR 45 on admission; improved; serum creatinine 1.09 with EGFR 49 on discharge     5.  Mild pulmonary hypertension by echo with RVSP 35-45     6.  Hypokalemia, mild-likely secondary to loop diuretics; corrected-normal at discharge    Principal Problem:    CHF (congestive heart failure)  Active Problems:    Pneumonia due to human metapneumovirus    Congestive heart failure with left ventricular diastolic dysfunction, acute on chronic      Presenting Problem/History of Present Illness     Active Hospital Problems    Diagnosis  POA    **CHF (congestive heart failure) [I50.9]  Yes    Congestive heart failure with left ventricular diastolic dysfunction, acute on chronic [I50.33]  Yes    Pneumonia due to human metapneumovirus [J12.3]  Yes      Resolved Hospital Problems   No resolved problems to display.       Hospital Course     Nir Nichole is a very pleasant and remarkably active 88-year-old WF with history  of AF, HPTN and mild hyperlipidemia who presented to [facility] ED earlier today complaining of progressive shortness of air, nonproductive cough and chest congestion x 5 to 6 days without documented fever or shaking chills.  She does admit to some generalized weakness fatigue and dyspnea on exertion and decreased appetite for the last few days.     ED evaluation included routine labs that showed normal CBC and near normal CMP except for serum creatinine 1.17/eGFR 45, troponin greater than 4200 and CXR with mild CHF superimposed on questionable left lower lobe interstitial markings/?  Infiltrate.  She was subsequently treated with IV Lasix and Solu-Medrol before admitting to my service on 2B for further evaluation and treatment.    During her brief hospitalization, she had mild complaints of transient episodic bifrontal headache that resolved with no other neurologic findings.  She remained on loop diuretics-initially given parenterally and then changed to oral  .  She was also started on IV Solu-Medrol and subsequently changed to oral tapering schedule of prednisone.  She improved clinically with normalized room air SaO2 at discharge.  Fluid electrolyte status monitor closely with gradual improvement.  She continues to have modest nonproductive cough with really no other constitutional complaints at time of discharge.  On discharge date, she is ambulating independently and maintaining satisfactory vital signs and SaO2 and is felt to be in stable satisfactory condition for discharge to home along with close family support.      Procedures Performed:     Transthoracic echocardiogram           Consults:      Consults       No orders found from 4/24/2024 to 5/24/2024.              Pertinent Test Results:      All reviewed in detail    Lab Results (last 48 hours)       Procedure Component Value Units Date/Time    Basic Metabolic Panel [[id number]]  (Abnormal) Collected: 05/26/24 0440    Specimen: Blood from Hand, Right Updated:  05/26/24 0530     Glucose 112 mg/dL      BUN 30 mg/dL      Creatinine 1.09 mg/dL      Sodium 140 mmol/L      Potassium 3.7 mmol/L      Chloride 104 mmol/L      CO2 23.8 mmol/L      Calcium 9.0 mg/dL      BUN/Creatinine Ratio 27.5     Anion Gap 12.2 mmol/L      eGFR 49.3 mL/min/1.73     Narrative:      GFR Normal >60  Chronic Kidney Disease <60  Kidney Failure <15    The GFR formula is only valid for adults with stable renal function between ages 18 and 70.    Basic Metabolic Panel [[id number]]  (Abnormal) Collected: 05/25/24 0521    Specimen: Blood from Arm, Right Updated: 05/25/24 0701     Glucose 127 mg/dL      BUN 30 mg/dL      Creatinine 1.19 mg/dL      Sodium 141 mmol/L      Potassium 4.2 mmol/L      Comment: Result checked          Chloride 103 mmol/L      CO2 23.0 mmol/L      Calcium 8.8 mg/dL      BUN/Creatinine Ratio 25.2     Anion Gap 15.0 mmol/L      eGFR 44.3 mL/min/1.73     Narrative:      GFR Normal >60  Chronic Kidney Disease <60  Kidney Failure <15    The GFR formula is only valid for adults with stable renal function between ages 18 and 70.          Imaging Results (Last 7 Days)       Procedure Component Value Units Date/Time    XR Chest 1 View [[id number]] Collected: 05/23/24 1248     Updated: 05/23/24 1251    Narrative:      XR CHEST 1 VW    Date of Exam: 5/23/2024 12:46 PM EDT    Indication: cough    Comparison: 11/28/2023    Findings:  Cardiomediastinal silhouette is enlarged. There is pulmonary vascular congestion with interstitial edema. There is left is airspace disease with small left effusion. No pneumothorax. No acute osseous abnormality identified.      Impression:      Impression:  Mild to moderate CHF features. Superimposed left lower lobe pneumonia not excluded. Correlate with symptoms.      Electronically Signed: Anibal Mota MD    5/23/2024 12:48 PM EDT    Workstation ID: [id number]          Telemetry Scan   Final Result      Telemetry Scan   Final Result      Telemetry Scan   Final  Result      Telemetry Scan   Final Result      Telemetry Scan   Final Result      Telemetry Scan   Final Result          Condition on Discharge:     Hemodynamically stable and satisfactory for discharge to home with no significant complaints  Vital Signs     Temp:  [97.1 °F (36.2 °C)-98.9 °F (37.2 °C)] 97.1 °F (36.2 °C)  Heart Rate:  [59-78] 75  Resp:  [14-21] 18  BP: (103-133)/(40-71) 133/71    Physical Exam:     HEENT: NC, EOMI, sclera clear, nose and oropharynx clear with mucosa moist  Lungs: CTA with adequate global airflow and satisfactory room air SaO2  Heart: Irregularly irregular rhythm consistent with atrial fibrillation with controlled ventricular rate; no M's or G's  Abd: Soft, NT/ND, Bowel sounds positive  Ext: no significant findings; lower leg brawny edema changes, chronic with no peripheral cyanosis or edema.  Good distal pulses.  Neuro: CN grossly intact, no focal deficits      Discharge Disposition         Discharge Medications        Discharge Medications        Continue These Medications        Instructions Start Date   atorvastatin 10 MG tablet  Commonly known as: LIPITOR   10 mg, Oral, Every Night at Bedtime      dilTIAZem  MG 24 hr capsule  Commonly known as: CARDIZEM CD   TAKE 1 CAPSULE BY MOUTH EVERY MORNING      metoprolol tartrate 25 MG tablet  Commonly known as: LOPRESSOR   1 tablet, Oral, Daily      triamterene-hydrochlorothiazide 37.5-25 MG per tablet  Commonly known as: MAXZIDE-25   1 tablet, Oral, Every Morning             ASK your doctor about these medications        Instructions Start Date   warfarin 6 MG tablet  Commonly known as: COUMADIN  Ask about: Which instructions should I use?   See Admin Instructions, Take one tablet on Sunday      warfarin 3 MG tablet  Commonly known as: COUMADIN  Ask about: Which instructions should I use?   See Admin Instructions, Take 1.5 tablets by mouth on Monday, Tuesday, Wednesday, Thursday, Friday, and Saturday                Discharge Diet:       Regular diet though advised limiting concentrated sweets and excessive salt intake-4 g sodium advised    Activity at Discharge:     Up as tolerated with extreme caution regarding risk for falls.    Follow-up Appointments     Follow-up with Dr. Trejo-PCP 7 to 10 days or sooner if needed    Test Results Pending at Discharge     None      Time Spent:      Greater than 35 minutes        Steven Trejo MD  [facility]  5/26/2024  11:05 EDT

## 2024-05-26 NOTE — OUTREACH NOTE
Prep Survey      Flowsheet Row Responses   [facility] discharged [facility]? [facility]   Is LACE score < 7 ? No   Eligibility Readm Mgmt   Discharge diagnosis CHF (congestive heart failure)   Does the patient have one of the following disease processes/diagnoses(primary or secondary)? CHF   Does the patient have Home health ordered? No   Is there a DME ordered? No   Prep survey completed? Yes            Aleah FOUNTAIN - Registered Nurse

## 2024-05-26 NOTE — PLAN OF CARE
Problem: Adult Inpatient Plan of Care  Goal: Plan of Care Review  Outcome: Met  Goal: Patient-Specific Goal (Individualized)  Outcome: Met  Goal: Absence of Hospital-Acquired Illness or Injury  Outcome: Met  Intervention: Identify and Manage Fall Risk  Recent Flowsheet Documentation  Taken 5/26/2024 1000 by Christine Spencer RN  Safety Promotion/Fall Prevention:   safety round/check completed   room organization consistent   nonskid shoes/slippers when out of bed   assistive device/personal items within reach   clutter free environment maintained  Taken 5/26/2024 0800 by Christine Spencer RN  Safety Promotion/Fall Prevention:   safety round/check completed   room organization consistent   nonskid shoes/slippers when out of bed   assistive device/personal items within reach   clutter free environment maintained  Intervention: Prevent and Manage VTE (Venous Thromboembolism) Risk  Recent Flowsheet Documentation  Taken 5/26/2024 0800 by Christine Spencer RN  Range of Motion: active ROM (range of motion) encouraged  Intervention: Prevent Infection  Recent Flowsheet Documentation  Taken 5/26/2024 1000 by Christine Spencer RN  Infection Prevention: environmental surveillance performed  Taken 5/26/2024 0800 by Christine Spencer RN  Infection Prevention: environmental surveillance performed  Goal: Optimal Comfort and Wellbeing  Outcome: Met  Goal: Readiness for Transition of Care  Outcome: Met     Problem: Hypertension Comorbidity  Goal: Blood Pressure in Desired Range  Outcome: Met  Intervention: Maintain Blood Pressure Management  Recent Flowsheet Documentation  Taken 5/26/2024 1000 by Christine Spencer RN  Medication Review/Management: medications reviewed  Taken 5/26/2024 0800 by Christine Spencer RN  Medication Review/Management: medications reviewed     Problem: Fall Injury Risk  Goal: Absence of Fall and Fall-Related Injury  Outcome: Met  Intervention: Identify and Manage Contributors  Recent Flowsheet  Documentation  Taken 5/26/2024 1000 by Christine Spencer, RN  Medication Review/Management: medications reviewed  Taken 5/26/2024 0800 by Christine Spencer RN  Medication Review/Management: medications reviewed  Intervention: Promote Injury-Free Environment  Recent Flowsheet Documentation  Taken 5/26/2024 1000 by Christine Spencer, RN  Safety Promotion/Fall Prevention:   safety round/check completed   room organization consistent   nonskid shoes/slippers when out of bed   assistive device/personal items within reach   clutter free environment maintained  Taken 5/26/2024 0800 by Christine Spencer, RN  Safety Promotion/Fall Prevention:   safety round/check completed   room organization consistent   nonskid shoes/slippers when out of bed   assistive device/personal items within reach   clutter free environment maintained   Goal Outcome Evaluation:            Patient and family understands discharge teaching and written materials. Patient alert and orient . Patient discharged home with children.

## 2024-05-30 ENCOUNTER — READMISSION MANAGEMENT (OUTPATIENT)
Dept: CALL CENTER | Facility: HOSPITAL | Age: 89
End: 2024-05-30
Payer: COMMERCIAL

## 2024-05-30 NOTE — OUTREACH NOTE
CHF Week 1 Survey      Flowsheet Row Responses   [facility]? [facility]   Does the patient have one of the following disease processes/diagnoses(primary or secondary)? CHF   CHF Week 1 attempt successful? Yes   Call end time 1232   Discharge diagnosis CHF (congestive heart failure)   Person spoke with today (if not patient) and relationship Daughter- Rahel Pierre reviewed with patient/caregiver? Yes   Does the patient have all medications ordered at discharge? Yes   Prescription comments No concerns or questions noted.   Is the patient taking all medications as directed (includes completed medication regime)? Yes   Does the patient have a primary care provider?  Yes   Comments regarding PCP Daughter has been in contact with Dr. Trejo.   Psychosocial issues? No   What is the patient's perception of their health status since discharge? Improving   Is the patient/caregiver able to teach back the hierarchy of who to call/visit for symptoms/problems? PCP, Specialist, Home health nurse, Urgent Care, ED, 911 Yes   CHF Zone this Call Green Zone   Green Zone Patient reports doing well    CHF Week 1 call completed? Yes   Is the patient interested in additional calls from an ambulatory ? No   Would this patient benefit from a Referral to Amb Social Work? No   Wrap up additional comments Daughter reports patient is doing well- no concerns or questions noted.   Call end time 1232            Zoila BERNARDO - Registered Nurse

## 2024-10-18 ENCOUNTER — ANTICOAGULATION VISIT (OUTPATIENT)
Dept: CARDIOLOGY | Facility: CLINIC | Age: 89
End: 2024-10-18
Payer: COMMERCIAL

## 2024-10-18 DIAGNOSIS — I48.20 CHRONIC ATRIAL FIBRILLATION: Primary | ICD-10-CM

## 2024-10-18 DIAGNOSIS — Z79.01 CHRONIC ANTICOAGULATION: ICD-10-CM

## 2024-12-16 RX ORDER — ATORVASTATIN CALCIUM 10 MG/1
10 TABLET, FILM COATED ORAL
Qty: 90 TABLET | Refills: 3 | OUTPATIENT
Start: 2024-12-16

## 2024-12-16 RX ORDER — DILTIAZEM HYDROCHLORIDE 240 MG/1
CAPSULE, COATED, EXTENDED RELEASE ORAL
Qty: 90 CAPSULE | Refills: 3 | OUTPATIENT
Start: 2024-12-16

## 2024-12-16 NOTE — TELEPHONE ENCOUNTER
Rx Refill Note  Requested Prescriptions     Refused Prescriptions Disp Refills    dilTIAZem CD (CARDIZEM CD) 240 MG 24 hr capsule [Pharmacy Med Name: DILTIAZEM CD 240MG CAPSULES (24 HR)] 90 capsule 3     Sig: TAKE 1 CAPSULE BY MOUTH EVERY MORNING     Refused By: LYNDA PAIZ     Reason for Refusal: Patient no longer under prescriber care    atorvastatin (LIPITOR) 10 MG tablet [Pharmacy Med Name: ATORVASTATIN 10MG TABLETS] 90 tablet 3     Sig: TAKE 1 TABLET BY MOUTH EVERY NIGHT AT BEDTIME     Refused By: LYNDA PAIZ     Reason for Refusal: Patient no longer under prescriber care      Last office visit with prescribing clinician: 3/7/2024   Last telemedicine visit with prescribing clinician: Visit date not found   Next office visit with prescribing clinician: Visit date not found                         Would you like a call back once the refill request has been completed: [] Yes [] No    If the office needs to give you a call back, can they leave a voicemail: [] Yes [] No    Lynda Paiz MA  12/16/24, 13:35 EST

## 2024-12-26 RX ORDER — DILTIAZEM HYDROCHLORIDE 240 MG/1
CAPSULE, COATED, EXTENDED RELEASE ORAL
Qty: 90 CAPSULE | Refills: 3 | Status: SHIPPED | OUTPATIENT
Start: 2024-12-26

## 2024-12-26 NOTE — TELEPHONE ENCOUNTER
Rx Refill Note  Requested Prescriptions     Pending Prescriptions Disp Refills    dilTIAZem CD (CARDIZEM CD) 240 MG 24 hr capsule [Pharmacy Med Name: DILTIAZEM CD 240MG CAPSULES (24 HR)] 90 capsule 3     Sig: TAKE 1 CAPSULE BY MOUTH EVERY MORNING      Last office visit with prescribing clinician: Visit date not found   Last telemedicine visit with prescribing clinician: Visit date not found   Next office visit with prescribing clinician: Visit date not found                         Would you like a call back once the refill request has been completed: [] Yes [] No    If the office needs to give you a call back, can they leave a voicemail: [] Yes [] No    Steff Camilo MA  12/26/24, 09:03 EST

## 2025-01-13 RX ORDER — ATORVASTATIN CALCIUM 10 MG/1
10 TABLET, FILM COATED ORAL
Qty: 90 TABLET | Refills: 3 | OUTPATIENT
Start: 2025-01-13

## 2025-01-13 NOTE — TELEPHONE ENCOUNTER
Rx Refill Note  Requested Prescriptions     Pending Prescriptions Disp Refills    atorvastatin (LIPITOR) 10 MG tablet [Pharmacy Med Name: ATORVASTATIN 10MG TABLETS] 90 tablet 3     Sig: TAKE 1 TABLET BY MOUTH EVERY NIGHT AT BEDTIME      Last office visit with prescribing clinician: 3/7/2024   Last telemedicine visit with prescribing clinician: Visit date not found   Next office visit with prescribing clinician: Visit date not found                         Would you like a call back once the refill request has been completed: [] Yes [] No    If the office needs to give you a call back, can they leave a voicemail: [] Yes [] No    Steff Camilo MA  01/13/25, 10:20 EST